# Patient Record
Sex: FEMALE | Race: WHITE | NOT HISPANIC OR LATINO | Employment: OTHER | URBAN - METROPOLITAN AREA
[De-identification: names, ages, dates, MRNs, and addresses within clinical notes are randomized per-mention and may not be internally consistent; named-entity substitution may affect disease eponyms.]

---

## 2021-04-14 ENCOUNTER — HISTORICAL (OUTPATIENT)
Dept: ADMINISTRATIVE | Facility: HOSPITAL | Age: 81
End: 2021-04-14

## 2021-04-14 LAB
ABS NEUT (OLG): 3.2 X10(3)/MCL (ref 2.1–9.2)
ALBUMIN SERPL-MCNC: 4.3 GM/DL (ref 3.4–4.8)
ALBUMIN/GLOB SERPL: 1.5 RATIO (ref 1.1–2)
ALP SERPL-CCNC: 89 UNIT/L (ref 40–150)
ALT SERPL-CCNC: 11 UNIT/L (ref 0–55)
AST SERPL-CCNC: 12 UNIT/L (ref 5–34)
BASOPHILS # BLD AUTO: 0.1 X10(3)/MCL (ref 0–0.2)
BASOPHILS NFR BLD AUTO: 1 %
BILIRUB SERPL-MCNC: 0.3 MG/DL
BILIRUBIN DIRECT+TOT PNL SERPL-MCNC: 0.1 MG/DL (ref 0–0.8)
BILIRUBIN DIRECT+TOT PNL SERPL-MCNC: 0.2 MG/DL (ref 0–0.5)
BUN SERPL-MCNC: 62 MG/DL (ref 9.8–20.1)
CALCIUM SERPL-MCNC: 9.6 MG/DL (ref 8.4–10.2)
CHLORIDE SERPL-SCNC: 103 MMOL/L (ref 98–107)
CHOLEST SERPL-MCNC: 188 MG/DL
CHOLEST/HDLC SERPL: 4 {RATIO} (ref 0–5)
CO2 SERPL-SCNC: 26 MMOL/L (ref 23–31)
CREAT SERPL-MCNC: 2.32 MG/DL (ref 0.55–1.02)
EOSINOPHIL # BLD AUTO: 0.4 X10(3)/MCL (ref 0–0.9)
EOSINOPHIL NFR BLD AUTO: 7 %
ERYTHROCYTE [DISTWIDTH] IN BLOOD BY AUTOMATED COUNT: 15.2 % (ref 11.5–17)
GLOBULIN SER-MCNC: 2.9 GM/DL (ref 2.4–3.5)
GLUCOSE SERPL-MCNC: 87 MG/DL (ref 82–115)
HCT VFR BLD AUTO: 44.7 % (ref 37–47)
HDLC SERPL-MCNC: 45 MG/DL (ref 35–60)
HGB BLD-MCNC: 13.9 GM/DL (ref 12–16)
LDLC SERPL CALC-MCNC: 106 MG/DL (ref 50–140)
LYMPHOCYTES # BLD AUTO: 1.7 X10(3)/MCL (ref 0.6–4.6)
LYMPHOCYTES NFR BLD AUTO: 27 %
MCH RBC QN AUTO: 28.9 PG (ref 27–31)
MCHC RBC AUTO-ENTMCNC: 31.1 GM/DL (ref 33–36)
MCV RBC AUTO: 92.9 FL (ref 80–94)
MONOCYTES # BLD AUTO: 0.7 X10(3)/MCL (ref 0.1–1.3)
MONOCYTES NFR BLD AUTO: 12 %
NEUTROPHILS # BLD AUTO: 3.2 X10(3)/MCL (ref 2.1–9.2)
NEUTROPHILS NFR BLD AUTO: 52 %
PLATELET # BLD AUTO: 447 X10(3)/MCL (ref 130–400)
PMV BLD AUTO: 9.6 FL (ref 9.4–12.4)
POTASSIUM SERPL-SCNC: 4.6 MMOL/L (ref 3.5–5.1)
PROT SERPL-MCNC: 7.2 GM/DL (ref 5.8–7.6)
RBC # BLD AUTO: 4.81 X10(6)/MCL (ref 4.2–5.4)
SODIUM SERPL-SCNC: 140 MMOL/L (ref 136–145)
TRIGL SERPL-MCNC: 185 MG/DL (ref 37–140)
TSH SERPL-ACNC: 1.27 UIU/ML (ref 0.35–4.94)
VLDLC SERPL CALC-MCNC: 37 MG/DL
WBC # SPEC AUTO: 6.2 X10(3)/MCL (ref 4.5–11.5)

## 2021-07-07 ENCOUNTER — HISTORICAL (OUTPATIENT)
Dept: ADMINISTRATIVE | Facility: HOSPITAL | Age: 81
End: 2021-07-07

## 2021-07-07 LAB
ABS NEUT (OLG): 3.76 X10(3)/MCL (ref 2.1–9.2)
ALBUMIN SERPL-MCNC: 3.5 GM/DL (ref 3.4–4.8)
ALBUMIN/GLOB SERPL: 1.2 RATIO (ref 1.1–2)
ALP SERPL-CCNC: 83 UNIT/L (ref 40–150)
ALT SERPL-CCNC: 7 UNIT/L (ref 0–55)
APPEARANCE, UA: CLEAR
AST SERPL-CCNC: 11 UNIT/L (ref 5–34)
BACTERIA #/AREA URNS AUTO: ABNORMAL /HPF
BASOPHILS # BLD AUTO: 0.1 X10(3)/MCL (ref 0–0.2)
BASOPHILS NFR BLD AUTO: 1 %
BILIRUB SERPL-MCNC: 0.6 MG/DL
BILIRUB UR QL STRIP: NEGATIVE
BILIRUBIN DIRECT+TOT PNL SERPL-MCNC: 0.3 MG/DL (ref 0–0.5)
BILIRUBIN DIRECT+TOT PNL SERPL-MCNC: 0.3 MG/DL (ref 0–0.8)
BUN SERPL-MCNC: 18.7 MG/DL (ref 9.8–20.1)
CALCIUM SERPL-MCNC: 9.7 MG/DL (ref 8.4–10.2)
CHLORIDE SERPL-SCNC: 106 MMOL/L (ref 98–107)
CO2 SERPL-SCNC: 26 MMOL/L (ref 23–31)
COLOR UR: YELLOW
CREAT SERPL-MCNC: 1.09 MG/DL (ref 0.55–1.02)
EOSINOPHIL # BLD AUTO: 0.9 X10(3)/MCL (ref 0–0.9)
EOSINOPHIL NFR BLD AUTO: 15 %
ERYTHROCYTE [DISTWIDTH] IN BLOOD BY AUTOMATED COUNT: 14.8 % (ref 11.5–17)
GLOBULIN SER-MCNC: 2.9 GM/DL (ref 2.4–3.5)
GLUCOSE (UA): NEGATIVE
GLUCOSE SERPL-MCNC: 98 MG/DL (ref 82–115)
HCT VFR BLD AUTO: 37.5 % (ref 37–47)
HGB BLD-MCNC: 11.5 GM/DL (ref 12–16)
HGB UR QL STRIP: NEGATIVE
KETONES UR QL STRIP: NEGATIVE
LEUKOCYTE ESTERASE UR QL STRIP: ABNORMAL
LYMPHOCYTES # BLD AUTO: 0.8 X10(3)/MCL (ref 0.6–4.6)
LYMPHOCYTES NFR BLD AUTO: 13 %
MAGNESIUM SERPL-MCNC: 2.1 MG/DL (ref 1.6–2.6)
MCH RBC QN AUTO: 29.3 PG (ref 27–31)
MCHC RBC AUTO-ENTMCNC: 30.7 GM/DL (ref 33–36)
MCV RBC AUTO: 95.7 FL (ref 80–94)
MONOCYTES # BLD AUTO: 0.5 X10(3)/MCL (ref 0.1–1.3)
MONOCYTES NFR BLD AUTO: 8 %
NEUTROPHILS # BLD AUTO: 3.76 X10(3)/MCL (ref 2.1–9.2)
NEUTROPHILS NFR BLD AUTO: 62 %
NITRITE UR QL STRIP.AUTO: NEGATIVE
PH UR STRIP: 7 [PH] (ref 5–9)
PLATELET # BLD AUTO: 271 X10(3)/MCL (ref 130–400)
PMV BLD AUTO: 10.4 FL (ref 9.4–12.4)
POTASSIUM SERPL-SCNC: 4.6 MMOL/L (ref 3.5–5.1)
PROT SERPL-MCNC: 6.4 GM/DL (ref 5.8–7.6)
PROT UR QL STRIP: NEGATIVE
RBC # BLD AUTO: 3.92 X10(6)/MCL (ref 4.2–5.4)
RBC #/AREA URNS HPF: ABNORMAL /[HPF]
SODIUM SERPL-SCNC: 145 MMOL/L (ref 136–145)
SP GR UR STRIP: 1.02 (ref 1–1.03)
SQUAMOUS EPITHELIAL, UA: ABNORMAL /HPF (ref 0–4)
URATE SERPL-MCNC: 6.4 MG/DL (ref 2.6–6)
UROBILINOGEN UR STRIP-ACNC: 0.2
WBC # SPEC AUTO: 6.1 X10(3)/MCL (ref 4.5–11.5)
WBC #/AREA URNS AUTO: 5 /HPF (ref 0–3)

## 2021-07-09 LAB — FINAL CULTURE: NORMAL

## 2021-07-28 ENCOUNTER — NURSE TRIAGE (OUTPATIENT)
Dept: ADMINISTRATIVE | Facility: CLINIC | Age: 81
End: 2021-07-28

## 2021-10-13 ENCOUNTER — HISTORICAL (OUTPATIENT)
Dept: ADMINISTRATIVE | Facility: HOSPITAL | Age: 81
End: 2021-10-13

## 2021-10-13 LAB
ABS NEUT (OLG): 3.22 X10(3)/MCL (ref 2.1–9.2)
ALBUMIN SERPL-MCNC: 4.1 GM/DL (ref 3.4–4.8)
ALBUMIN/GLOB SERPL: 1.2 RATIO (ref 1.1–2)
ALP SERPL-CCNC: 90 UNIT/L (ref 40–150)
ALT SERPL-CCNC: 8 UNIT/L (ref 0–55)
APPEARANCE, UA: CLEAR
AST SERPL-CCNC: 14 UNIT/L (ref 5–34)
BACTERIA SPEC CULT: ABNORMAL /HPF
BASOPHILS # BLD AUTO: 0.1 X10(3)/MCL (ref 0–0.2)
BASOPHILS NFR BLD AUTO: 1 %
BILIRUB SERPL-MCNC: 0.6 MG/DL
BILIRUB UR QL STRIP: NEGATIVE
BILIRUBIN DIRECT+TOT PNL SERPL-MCNC: 0.2 MG/DL (ref 0–0.5)
BILIRUBIN DIRECT+TOT PNL SERPL-MCNC: 0.4 MG/DL (ref 0–0.8)
BUN SERPL-MCNC: 28.7 MG/DL (ref 9.8–20.1)
CALCIUM SERPL-MCNC: 10.5 MG/DL (ref 8.4–10.2)
CHLORIDE SERPL-SCNC: 103 MMOL/L (ref 98–107)
CHOLEST SERPL-MCNC: 204 MG/DL
CHOLEST/HDLC SERPL: 4 {RATIO} (ref 0–5)
CO2 SERPL-SCNC: 29 MMOL/L (ref 23–31)
COLOR UR: YELLOW
CREAT SERPL-MCNC: 1.31 MG/DL (ref 0.55–1.02)
EOSINOPHIL # BLD AUTO: 0.7 X10(3)/MCL (ref 0–0.9)
EOSINOPHIL NFR BLD AUTO: 12 %
ERYTHROCYTE [DISTWIDTH] IN BLOOD BY AUTOMATED COUNT: 14.5 % (ref 11.5–17)
GLOBULIN SER-MCNC: 3.3 GM/DL (ref 2.4–3.5)
GLUCOSE (UA): NEGATIVE
GLUCOSE SERPL-MCNC: 99 MG/DL (ref 82–115)
HCT VFR BLD AUTO: 46.5 % (ref 37–47)
HDLC SERPL-MCNC: 55 MG/DL (ref 35–60)
HGB BLD-MCNC: 14.4 GM/DL (ref 12–16)
HGB UR QL STRIP: NEGATIVE
KETONES UR QL STRIP: NEGATIVE
LDLC SERPL CALC-MCNC: 128 MG/DL (ref 50–140)
LEUKOCYTE ESTERASE UR QL STRIP: ABNORMAL
LYMPHOCYTES # BLD AUTO: 1.1 X10(3)/MCL (ref 0.6–4.6)
LYMPHOCYTES NFR BLD AUTO: 20 %
MAGNESIUM SERPL-MCNC: 2.1 MG/DL (ref 1.6–2.6)
MCH RBC QN AUTO: 29.5 PG (ref 27–31)
MCHC RBC AUTO-ENTMCNC: 31 GM/DL (ref 33–36)
MCV RBC AUTO: 95.3 FL (ref 80–94)
MONOCYTES # BLD AUTO: 0.6 X10(3)/MCL (ref 0.1–1.3)
MONOCYTES NFR BLD AUTO: 10 %
NEUTROPHILS # BLD AUTO: 3.22 X10(3)/MCL (ref 2.1–9.2)
NEUTROPHILS NFR BLD AUTO: 56 %
NITRITE UR QL STRIP: NEGATIVE
PH UR STRIP: 6 [PH] (ref 5–9)
PLATELET # BLD AUTO: 354 X10(3)/MCL (ref 130–400)
PMV BLD AUTO: 10.1 FL (ref 9.4–12.4)
POTASSIUM SERPL-SCNC: 5.1 MMOL/L (ref 3.5–5.1)
PROT SERPL-MCNC: 7.4 GM/DL (ref 5.8–7.6)
PROT UR QL STRIP: NEGATIVE
RBC # BLD AUTO: 4.88 X10(6)/MCL (ref 4.2–5.4)
RBC #/AREA URNS HPF: ABNORMAL /[HPF]
SODIUM SERPL-SCNC: 145 MMOL/L (ref 136–145)
SP GR UR STRIP: 1.02 (ref 1–1.03)
SQUAMOUS EPITHELIAL, UA: ABNORMAL /HPF (ref 0–4)
TRIGL SERPL-MCNC: 103 MG/DL (ref 37–140)
URATE SERPL-MCNC: 8.4 MG/DL (ref 2.6–6)
UROBILINOGEN UR STRIP-ACNC: 0.2
VLDLC SERPL CALC-MCNC: 21 MG/DL
WBC # SPEC AUTO: 5.8 X10(3)/MCL (ref 4.5–11.5)
WBC #/AREA URNS HPF: ABNORMAL /[HPF]

## 2021-12-09 ENCOUNTER — HISTORICAL (OUTPATIENT)
Dept: ADMINISTRATIVE | Facility: HOSPITAL | Age: 81
End: 2021-12-09

## 2021-12-09 LAB
BUN SERPL-MCNC: 24 MG/DL (ref 9.8–20.1)
CALCIUM SERPL-MCNC: 9.2 MG/DL (ref 8.7–10.5)
CHLORIDE SERPL-SCNC: 105 MMOL/L (ref 98–107)
CO2 SERPL-SCNC: 30 MMOL/L (ref 23–31)
CREAT SERPL-MCNC: 1.03 MG/DL (ref 0.55–1.02)
CREAT/UREA NIT SERPL: 23
GLUCOSE SERPL-MCNC: 102 MG/DL (ref 82–115)
POTASSIUM SERPL-SCNC: 4.3 MMOL/L (ref 3.5–5.1)
SODIUM SERPL-SCNC: 141 MMOL/L (ref 136–145)

## 2022-01-12 ENCOUNTER — HISTORICAL (OUTPATIENT)
Dept: ADMINISTRATIVE | Facility: HOSPITAL | Age: 82
End: 2022-01-12

## 2022-01-12 LAB
ABS NEUT (OLG): 4.6 X10(3)/MCL (ref 2.1–9.2)
ALBUMIN SERPL-MCNC: 3.8 GM/DL (ref 3.4–4.8)
ALBUMIN/GLOB SERPL: 1.3 RATIO (ref 1.1–2)
ALP SERPL-CCNC: 105 UNIT/L (ref 40–150)
ALT SERPL-CCNC: 8 UNIT/L (ref 0–55)
APPEARANCE, UA: CLEAR
AST SERPL-CCNC: 12 UNIT/L (ref 5–34)
BACTERIA SPEC CULT: ABNORMAL /HPF
BASOPHILS # BLD AUTO: 0.1 X10(3)/MCL (ref 0–0.2)
BASOPHILS NFR BLD AUTO: 1 %
BILIRUB SERPL-MCNC: 0.3 MG/DL
BILIRUB UR QL STRIP: NEGATIVE
BILIRUBIN DIRECT+TOT PNL SERPL-MCNC: 0.1 MG/DL (ref 0–0.8)
BILIRUBIN DIRECT+TOT PNL SERPL-MCNC: 0.2 MG/DL (ref 0–0.5)
BUN SERPL-MCNC: 22.6 MG/DL (ref 9.8–20.1)
CALCIUM SERPL-MCNC: 9.5 MG/DL (ref 8.7–10.5)
CHLORIDE SERPL-SCNC: 99 MMOL/L (ref 98–107)
CO2 SERPL-SCNC: 30 MMOL/L (ref 23–31)
COLOR UR: YELLOW
CREAT SERPL-MCNC: 1.23 MG/DL (ref 0.55–1.02)
EOSINOPHIL # BLD AUTO: 0.7 X10(3)/MCL (ref 0–0.9)
EOSINOPHIL NFR BLD AUTO: 10 %
ERYTHROCYTE [DISTWIDTH] IN BLOOD BY AUTOMATED COUNT: 13.8 % (ref 11.5–17)
GLOBULIN SER-MCNC: 3 GM/DL (ref 2.4–3.5)
GLUCOSE (UA): NEGATIVE
GLUCOSE SERPL-MCNC: 99 MG/DL (ref 82–115)
HCT VFR BLD AUTO: 42.5 % (ref 37–47)
HGB BLD-MCNC: 13 GM/DL (ref 12–16)
HGB UR QL STRIP: NEGATIVE
KETONES UR QL STRIP: NEGATIVE
LEUKOCYTE ESTERASE UR QL STRIP: ABNORMAL
LYMPHOCYTES # BLD AUTO: 0.8 X10(3)/MCL (ref 0.6–4.6)
LYMPHOCYTES NFR BLD AUTO: 12 %
MAGNESIUM SERPL-MCNC: 2 MG/DL (ref 1.6–2.6)
MCH RBC QN AUTO: 30.4 PG (ref 27–31)
MCHC RBC AUTO-ENTMCNC: 30.6 GM/DL (ref 33–36)
MCV RBC AUTO: 99.5 FL (ref 80–94)
MONOCYTES # BLD AUTO: 0.6 X10(3)/MCL (ref 0.1–1.3)
MONOCYTES NFR BLD AUTO: 9 %
NEUTROPHILS # BLD AUTO: 4.6 X10(3)/MCL (ref 2.1–9.2)
NEUTROPHILS NFR BLD AUTO: 66 %
NITRITE UR QL STRIP: NEGATIVE
PH UR STRIP: 6 [PH] (ref 5–9)
PLATELET # BLD AUTO: 336 X10(3)/MCL (ref 130–400)
PMV BLD AUTO: 9.9 FL (ref 9.4–12.4)
POTASSIUM SERPL-SCNC: 4 MMOL/L (ref 3.5–5.1)
PROT SERPL-MCNC: 6.8 GM/DL (ref 5.8–7.6)
PROT UR QL STRIP: NEGATIVE
RBC # BLD AUTO: 4.27 X10(6)/MCL (ref 4.2–5.4)
RBC #/AREA URNS HPF: ABNORMAL /[HPF]
SODIUM SERPL-SCNC: 141 MMOL/L (ref 136–145)
SP GR UR STRIP: 1.02 (ref 1–1.03)
SQUAMOUS EPITHELIAL, UA: ABNORMAL /HPF (ref 0–4)
URATE SERPL-MCNC: 6 MG/DL (ref 2.6–6)
UROBILINOGEN UR STRIP-ACNC: 0.2
WBC # SPEC AUTO: 6.9 X10(3)/MCL (ref 4.5–11.5)
WBC #/AREA URNS AUTO: ABNORMAL /HPF (ref 0–3)
WBC #/AREA URNS HPF: ABNORMAL /[HPF]

## 2022-04-11 ENCOUNTER — HISTORICAL (OUTPATIENT)
Dept: ADMINISTRATIVE | Facility: HOSPITAL | Age: 82
End: 2022-04-11
Payer: MEDICARE

## 2022-04-29 VITALS
OXYGEN SATURATION: 98 % | WEIGHT: 169.31 LBS | BODY MASS INDEX: 31.97 KG/M2 | SYSTOLIC BLOOD PRESSURE: 154 MMHG | HEIGHT: 61 IN | DIASTOLIC BLOOD PRESSURE: 82 MMHG

## 2022-05-10 ENCOUNTER — TELEPHONE (OUTPATIENT)
Dept: FAMILY MEDICINE | Facility: CLINIC | Age: 82
End: 2022-05-10
Payer: MEDICARE

## 2022-05-10 NOTE — TELEPHONE ENCOUNTER
Patient called back to inform me that she does not have anyone going to her house to check her BP anymore, and she only has a manual BP cuff. States she has not been having any side effects from the increased dosage of Amlodipine. Instructed pt to buy an electronic BP cuff to start monitoring at home, and if BP is elevated to give us a call to notify us of that reading. Pt verbalized understanding and states she will go get a cuff.

## 2022-06-15 ENCOUNTER — OFFICE VISIT (OUTPATIENT)
Dept: FAMILY MEDICINE | Facility: CLINIC | Age: 82
End: 2022-06-15
Payer: MEDICARE

## 2022-06-15 VITALS
RESPIRATION RATE: 16 BRPM | HEART RATE: 62 BPM | DIASTOLIC BLOOD PRESSURE: 74 MMHG | OXYGEN SATURATION: 96 % | SYSTOLIC BLOOD PRESSURE: 136 MMHG | TEMPERATURE: 99 F | HEIGHT: 61 IN | BODY MASS INDEX: 34.43 KG/M2 | WEIGHT: 182.38 LBS

## 2022-06-15 DIAGNOSIS — L30.4 INTERTRIGO: ICD-10-CM

## 2022-06-15 DIAGNOSIS — I50.9 CONGESTIVE HEART FAILURE, UNSPECIFIED HF CHRONICITY, UNSPECIFIED HEART FAILURE TYPE: Primary | ICD-10-CM

## 2022-06-15 DIAGNOSIS — G62.9 PERIPHERAL POLYNEUROPATHY: ICD-10-CM

## 2022-06-15 PROBLEM — Z86.718 HISTORY OF DEEP VENOUS THROMBOSIS: Status: ACTIVE | Noted: 2022-06-15

## 2022-06-15 PROBLEM — I48.91 ATRIAL FIBRILLATION: Status: ACTIVE | Noted: 2022-06-15

## 2022-06-15 PROBLEM — M10.9 GOUT: Status: ACTIVE | Noted: 2022-06-15

## 2022-06-15 PROBLEM — F41.1 GENERALIZED ANXIETY DISORDER: Status: ACTIVE | Noted: 2022-06-15

## 2022-06-15 PROBLEM — Z78.0 POSTMENOPAUSAL: Status: ACTIVE | Noted: 2022-06-15

## 2022-06-15 PROBLEM — I73.9 PERIPHERAL ARTERIAL DISEASE: Status: ACTIVE | Noted: 2022-06-15

## 2022-06-15 PROBLEM — M54.16 LUMBAR RADICULOPATHY: Status: ACTIVE | Noted: 2022-06-15

## 2022-06-15 PROBLEM — E78.5 HYPERLIPIDEMIA: Status: ACTIVE | Noted: 2022-06-15

## 2022-06-15 PROBLEM — N18.9 CHRONIC KIDNEY DISEASE: Status: ACTIVE | Noted: 2022-06-15

## 2022-06-15 PROBLEM — J44.9 CHRONIC OBSTRUCTIVE PULMONARY DISEASE: Status: ACTIVE | Noted: 2022-06-15

## 2022-06-15 PROBLEM — J98.59 MEDIASTINAL MASS: Status: ACTIVE | Noted: 2022-06-15

## 2022-06-15 PROBLEM — I10 HYPERTENSION: Status: ACTIVE | Noted: 2022-06-15

## 2022-06-15 PROCEDURE — 3078F DIAST BP <80 MM HG: CPT | Mod: CPTII,,, | Performed by: NURSE PRACTITIONER

## 2022-06-15 PROCEDURE — 1159F MED LIST DOCD IN RCRD: CPT | Mod: CPTII,,, | Performed by: NURSE PRACTITIONER

## 2022-06-15 PROCEDURE — 1100F PR PT FALLS ASSESS DOC 2+ FALLS/FALL W/INJURY/YR: ICD-10-PCS | Mod: CPTII,,, | Performed by: NURSE PRACTITIONER

## 2022-06-15 PROCEDURE — 1100F PTFALLS ASSESS-DOCD GE2>/YR: CPT | Mod: CPTII,,, | Performed by: NURSE PRACTITIONER

## 2022-06-15 PROCEDURE — 99213 OFFICE O/P EST LOW 20 MIN: CPT | Mod: ,,, | Performed by: NURSE PRACTITIONER

## 2022-06-15 PROCEDURE — 3075F SYST BP GE 130 - 139MM HG: CPT | Mod: CPTII,,, | Performed by: NURSE PRACTITIONER

## 2022-06-15 PROCEDURE — 1159F PR MEDICATION LIST DOCUMENTED IN MEDICAL RECORD: ICD-10-PCS | Mod: CPTII,,, | Performed by: NURSE PRACTITIONER

## 2022-06-15 PROCEDURE — 3078F PR MOST RECENT DIASTOLIC BLOOD PRESSURE < 80 MM HG: ICD-10-PCS | Mod: CPTII,,, | Performed by: NURSE PRACTITIONER

## 2022-06-15 PROCEDURE — 1160F PR REVIEW ALL MEDS BY PRESCRIBER/CLIN PHARMACIST DOCUMENTED: ICD-10-PCS | Mod: CPTII,,, | Performed by: NURSE PRACTITIONER

## 2022-06-15 PROCEDURE — 1160F RVW MEDS BY RX/DR IN RCRD: CPT | Mod: CPTII,,, | Performed by: NURSE PRACTITIONER

## 2022-06-15 PROCEDURE — 3075F PR MOST RECENT SYSTOLIC BLOOD PRESS GE 130-139MM HG: ICD-10-PCS | Mod: CPTII,,, | Performed by: NURSE PRACTITIONER

## 2022-06-15 PROCEDURE — 1125F AMNT PAIN NOTED PAIN PRSNT: CPT | Mod: CPTII,,, | Performed by: NURSE PRACTITIONER

## 2022-06-15 PROCEDURE — 3288F PR FALLS RISK ASSESSMENT DOCUMENTED: ICD-10-PCS | Mod: CPTII,,, | Performed by: NURSE PRACTITIONER

## 2022-06-15 PROCEDURE — 99213 PR OFFICE/OUTPT VISIT, EST, LEVL III, 20-29 MIN: ICD-10-PCS | Mod: ,,, | Performed by: NURSE PRACTITIONER

## 2022-06-15 PROCEDURE — 3288F FALL RISK ASSESSMENT DOCD: CPT | Mod: CPTII,,, | Performed by: NURSE PRACTITIONER

## 2022-06-15 PROCEDURE — 1125F PR PAIN SEVERITY QUANTIFIED, PAIN PRESENT: ICD-10-PCS | Mod: CPTII,,, | Performed by: NURSE PRACTITIONER

## 2022-06-15 RX ORDER — FENOFIBRATE 54 MG/1
54 TABLET ORAL DAILY
COMMUNITY
Start: 2022-04-28 | End: 2022-06-17

## 2022-06-15 RX ORDER — MONTELUKAST SODIUM 10 MG/1
10 TABLET ORAL DAILY
COMMUNITY
Start: 2022-04-28 | End: 2022-06-17

## 2022-06-15 RX ORDER — METOPROLOL SUCCINATE 100 MG/1
100 TABLET, EXTENDED RELEASE ORAL DAILY
COMMUNITY
Start: 2022-04-28 | End: 2023-07-11

## 2022-06-15 RX ORDER — FLUTICASONE PROPIONATE 50 MCG
SPRAY, SUSPENSION (ML) NASAL
COMMUNITY
Start: 2022-05-09 | End: 2022-06-29

## 2022-06-15 RX ORDER — THEOPHYLLINE 400 MG/1
0.5 TABLET, EXTENDED RELEASE ORAL 2 TIMES DAILY
COMMUNITY
Start: 2021-12-27 | End: 2022-06-28

## 2022-06-15 RX ORDER — LORAZEPAM 1 MG/1
1 TABLET ORAL 2 TIMES DAILY
COMMUNITY
Start: 2022-04-21 | End: 2022-07-11

## 2022-06-15 RX ORDER — FERROUS SULFATE 325(65) MG
325 TABLET ORAL
COMMUNITY
Start: 2021-07-28 | End: 2023-07-11

## 2022-06-15 RX ORDER — PREGABALIN 75 MG/1
75 CAPSULE ORAL 2 TIMES DAILY
Qty: 60 CAPSULE | Refills: 2 | Status: SHIPPED | OUTPATIENT
Start: 2022-06-15 | End: 2022-08-31

## 2022-06-15 RX ORDER — ALLOPURINOL 100 MG/1
TABLET ORAL
COMMUNITY
Start: 2022-05-02 | End: 2022-09-14

## 2022-06-15 RX ORDER — METHOCARBAMOL 750 MG/1
TABLET, FILM COATED ORAL
COMMUNITY
Start: 2022-01-11 | End: 2023-07-11

## 2022-06-15 RX ORDER — AMLODIPINE BESYLATE 10 MG/1
10 TABLET ORAL DAILY
COMMUNITY
Start: 2022-04-25 | End: 2023-04-10

## 2022-06-15 RX ORDER — DOXEPIN HYDROCHLORIDE 25 MG/1
25 CAPSULE ORAL NIGHTLY
COMMUNITY
Start: 2022-04-28 | End: 2022-06-17

## 2022-06-15 RX ORDER — APIXABAN 5 MG/1
5 TABLET, FILM COATED ORAL 2 TIMES DAILY
COMMUNITY
Start: 2022-05-02

## 2022-06-15 RX ORDER — PREGABALIN 50 MG/1
50 CAPSULE ORAL 2 TIMES DAILY
COMMUNITY
Start: 2022-04-28 | End: 2022-06-15

## 2022-06-15 RX ORDER — NYSTATIN 100000 [USP'U]/G
POWDER TOPICAL 4 TIMES DAILY
Qty: 60 G | Refills: 5 | Status: SHIPPED | OUTPATIENT
Start: 2022-06-15 | End: 2023-07-11

## 2022-06-15 RX ORDER — ALBUTEROL SULFATE 0.83 MG/ML
SOLUTION RESPIRATORY (INHALATION)
COMMUNITY
Start: 2022-04-27 | End: 2023-07-25

## 2022-06-15 RX ORDER — BUDESONIDE, GLYCOPYRROLATE, AND FORMOTEROL FUMARATE 160; 9; 4.8 UG/1; UG/1; UG/1
AEROSOL, METERED RESPIRATORY (INHALATION)
COMMUNITY
Start: 2022-04-25 | End: 2022-09-07 | Stop reason: SDUPTHER

## 2022-06-15 NOTE — PROGRESS NOTES
Subjective:       Patient ID: Jaylin Marcum is a 81 y.o. female.    Chief Complaint: Leg Swelling (X2 weeks), Ankle Pain (X2 weeks), and Rash (Under bilat breast for a couple of months)      HPI     This is an 81-year-old white female who presents to clinic today with complaint of bilateral ankle and foot burning pain that has been worsening over the last 2 weeks.  Also has been having increased bilateral lower extremity swelling for the last couple of weeks.  Has a rash under both breasts that has been there for months.      Review of Systems   Constitutional: Negative.    HENT: Negative.    Eyes: Negative.    Respiratory: Positive for shortness of breath.    Cardiovascular: Positive for leg swelling.   Gastrointestinal: Negative.    Endocrine: Negative.    Genitourinary: Negative.    Musculoskeletal: Negative.  Positive for leg pain.   Integumentary:  Positive for rash.   Allergic/Immunologic: Negative.    Neurological: Negative.    Hematological: Negative.    Psychiatric/Behavioral: Negative.    All other systems reviewed and are negative.          The patient's Health Maintenance was reviewed and the following appears to be due:   Health Maintenance Due   Topic Date Due    COVID-19 Vaccine (1) Never done    DEXA Scan  Never done    Shingles Vaccine (1 of 2) Never done    Pneumococcal Vaccines (Age 65+) (1 - PCV) Never done    TETANUS VACCINE  10/14/2015       Past Medical History:  Past Medical History:   Diagnosis Date    Anxiety disorder, unspecified     CHF (congestive heart failure)     Chronic obstructive lung disease     CKD (chronic kidney disease)     DVT (deep venous thrombosis)     Gout, unspecified     Hyperlipidemia     Hypertensive disorder     Lumbar radiculopathy     Mediastinal mass     Paroxysmal atrial fibrillation     Peripheral artery disease     Postmenopausal state      Past Surgical History:   Procedure Laterality Date    APPENDECTOMY      DILATION AND CURETTAGE OF  UTERUS       Review of patient's allergies indicates:   Allergen Reactions    Penicillin Rash    Codeine Itching     Current Outpatient Medications on File Prior to Visit   Medication Sig Dispense Refill    albuterol (PROVENTIL) 2.5 mg /3 mL (0.083 %) nebulizer solution       allopurinoL (ZYLOPRIM) 100 MG tablet TAKE ONE TABLET BY MOUTH DAILY for gout      amLODIPine (NORVASC) 10 MG tablet Take 10 mg by mouth once daily.      BREZTRI AEROSPHERE 160-9-4.8 mcg/actuation HFAA use 2 piuffs by mouth twice daily rinse mouth and throat after use      doxepin (SINEQUAN) 25 MG capsule Take 25 mg by mouth every evening.      ELIQUIS 5 mg Tab Take 5 mg by mouth 2 (two) times daily.      fenofibrate (TRICOR) 54 MG tablet Take 54 mg by mouth once daily.      ferrous sulfate (FEROSUL) 325 mg (65 mg iron) Tab tablet Take 325 mg by mouth.      fluticasone propionate (FLONASE) 50 mcg/actuation nasal spray       LORazepam (ATIVAN) 1 MG tablet Take 1 mg by mouth 2 (two) times daily.      metoprolol succinate (TOPROL-XL) 100 MG 24 hr tablet Take 100 mg by mouth once daily.      montelukast (SINGULAIR) 10 mg tablet Take 10 mg by mouth once daily.      theophylline 400 mg Tb24 Take 0.5 tablets by mouth 2 (two) times daily.      [DISCONTINUED] pregabalin (LYRICA) 50 MG capsule Take 50 mg by mouth 2 (two) times daily.      cetirizine (ZYRTEC) 10 MG tablet TAKE ONE TABLET BY MOUTH IN THE EVENING 30 tablet 5    furosemide (LASIX) 40 MG tablet TAKE ONE TABLET BY MOUTH IN THE MORNING and at 2 pm 60 tablet 5    methocarbamoL (ROBAXIN) 750 MG Tab TAKE ONE TABLET BY MOUTH THREE TIMES DAILY AS NEEDED for back pain and spasm       No current facility-administered medications on file prior to visit.     Social History     Socioeconomic History    Marital status:    Tobacco Use    Smoking status: Former Smoker    Smokeless tobacco: Never Used   Substance and Sexual Activity    Alcohol use: Not Currently    Drug use:  "Never    Sexual activity: Not Currently     Family History   Problem Relation Age of Onset    Lung cancer Mother     Brain cancer Mother     Heart disease Father     Heart disease Brother          Objective:       /74 (BP Location: Left arm)   Pulse 62   Temp 98.9 °F (37.2 °C) (Oral)   Resp 16   Ht 5' 1" (1.549 m)   Wt 82.7 kg (182 lb 6.4 oz)   SpO2 96%   BMI 34.46 kg/m²      Physical Exam  Vitals and nursing note reviewed.   Constitutional:       Appearance: Normal appearance.   HENT:      Head: Normocephalic and atraumatic.      Right Ear: Tympanic membrane, ear canal and external ear normal.      Left Ear: Tympanic membrane, ear canal and external ear normal.      Nose: Nose normal.      Mouth/Throat:      Mouth: Mucous membranes are moist.      Pharynx: Oropharynx is clear.   Eyes:      Extraocular Movements: Extraocular movements intact.      Conjunctiva/sclera: Conjunctivae normal.      Pupils: Pupils are equal, round, and reactive to light.   Cardiovascular:      Rate and Rhythm: Normal rate and regular rhythm.      Heart sounds: Normal heart sounds.   Pulmonary:      Effort: Pulmonary effort is normal.      Breath sounds: Normal breath sounds.   Musculoskeletal:         General: Normal range of motion.      Cervical back: Normal range of motion and neck supple.      Right lower le+ Pitting Edema present.      Left lower le+ Pitting Edema present.   Skin:     General: Skin is warm and dry.      Findings: Rash (Beefy red under both breasts) present.   Neurological:      General: No focal deficit present.      Mental Status: She is alert and oriented to person, place, and time.   Psychiatric:         Mood and Affect: Mood normal.         Behavior: Behavior normal.         Thought Content: Thought content normal.         Judgment: Judgment normal.         Labs  No visits with results within 2 Month(s) from this visit.   Latest known visit with results is:   Historical on 2022 "   Component Date Value Ref Range Status    Alanine Aminotransferase 01/12/2022 8  0 - 55 unit/L Final    Albumin/Globulin Ratio 01/12/2022 1.3  1.1 - 2.0 ratio Final    Albumin Level 01/12/2022 3.8  3.4 - 4.8 gm/dL Final    Alkaline Phosphatase 01/12/2022 105  40 - 150 unit/L Final    Aspartate Aminotransferase 01/12/2022 12  5 - 34 unit/L Final    Blood Urea Nitrogen 01/12/2022 22.6 (A) 9.8 - 20.1 mg/dL Final    Bilirubin Total 01/12/2022 0.3  <<=1.5 mg/dL Final    Bilirubin Direct 01/12/2022 0.2  0.0 - 0.5 mg/dL Final    Bilirubin Indirect 01/12/2022 0.10  0.00 - 0.80 mg/dL Final    Chloride 01/12/2022 99  98 - 107 mmol/L Final    Carbon Dioxide 01/12/2022 30  23 - 31 mmol/L Final    Calcium Level Total 01/12/2022 9.5  8.7 - 10.5 mg/dL Final    Creatinine 01/12/2022 1.23 (A) 0.55 - 1.02 mg/dL Final    Glucose Level 01/12/2022 99  82 - 115 mg/dL Final    Globulin 01/12/2022 3.0  2.4 - 3.5 gm/dL Final    Sodium Level 01/12/2022 141  136 - 145 mmol/L Final    Potassium Level 01/12/2022 4.0  3.5 - 5.1 mmol/L Final    Protein Total 01/12/2022 6.8  5.8 - 7.6 gm/dL Final    Magnesium Level 01/12/2022 2.00  1.60 - 2.60 mg/dL Final    Uric Acid 01/12/2022 6.0  2.6 - 6.0 mg/dL Final    Estimated GFR- 01/12/2022 54   Final    Estimated GFR-Non  01/12/2022 45  mL/min/1.73 m2 Final    Abs Neut 01/12/2022 4.60  2.10 - 9.20 x10(3)/mcL Final    Hgb 01/12/2022 13.0  12.0 - 16.0 gm/dL Final    Hct 01/12/2022 42.5  37.0 - 47.0 % Final    MCV 01/12/2022 99.5 (A) 80.0 - 94.0 fL Final    MCH 01/12/2022 30.4  27.0 - 31.0 pg Final    MCHC 01/12/2022 30.6 (A) 33.0 - 36.0 gm/dL Final    MPV 01/12/2022 9.9  9.4 - 12.4 fL Final    WBC 01/12/2022 6.9  4.5 - 11.5 x10(3)/mcL Final    RBC 01/12/2022 4.27  4.20 - 5.40 x10(6)/mcL Final    RDW 01/12/2022 13.8  11.5 - 17.0 % Final    Platelet 01/12/2022 336  130 - 400 x10(3)/mcL Final    Basophil % 01/12/2022 1  % Final    Neut #  01/12/2022 4.60  2.10 - 9.20 x10(3)/mcL Final    Lymph # 01/12/2022 0.8  0.6 - 4.6 x10(3)/mcL Final    Mono # 01/12/2022 0.6  0.1 - 1.3 x10(3)/mcL Final    Eos # 01/12/2022 0.7  0.0 - 0.9 x10(3)/mcL Final    Baso # 01/12/2022 0.1  0.0 - 0.2 x10(3)/mcL Final    Neut % 01/12/2022 66  % Final    Lymph % 01/12/2022 12  % Final    Mono % 01/12/2022 9  % Final    Eos % 01/12/2022 10  % Final    Color, UA 01/12/2022 YELLOW  >YELLOW Final    Appearance, UA 01/12/2022 CLEAR  >Clear Final    Specific Gravity,UA 01/12/2022 1.019  1.000 - 1.032 Final    pH, UA 01/12/2022 6.0  5.0 - 9.0 Final    Glucose, UA 01/12/2022 Negative  >Negative Final    Protein, UA 01/12/2022 Negative  >Negative Final    Occult Blood UA 01/12/2022 Negative  >Negative Final    Ketones, UA 01/12/2022 Negative  >Negative Final    Bilirubin (UA) 01/12/2022 Negative  >Negative Final    Urobilinogen, UA 01/12/2022 0.2   Final    Leukocytes, UA 01/12/2022 Trace (A) >Negative Final    Nitrite, UA 01/12/2022 Negative  >Negative Final    WBC, UA 01/12/2022 NONE SEEN  >0 - 2 Final    RBC, UA 01/12/2022 NONE SEEN  >0 - 2 Final    Bacteria 01/12/2022 NONE SEEN  >None Seen /HPF Final    Squam Epithel, UA 01/12/2022 NONE SEEN  0 - 4 /HPF Final             Assessment:       Problem List Items Addressed This Visit        Neuro    Peripheral neuropathy    Relevant Medications    pregabalin (LYRICA) 75 MG capsule       Derm    Intertrigo    Relevant Medications    nystatin (MYCOSTATIN) powder          Plan:         1. Intertrigo  Nystatin powder as directed.  - nystatin (MYCOSTATIN) powder; Apply topically 4 (four) times daily.  Dispense: 60 g; Refill: 5    2. Peripheral polyneuropathy  Increase Lyrica to 75 mg twice daily.  - pregabalin (LYRICA) 75 MG capsule; Take 1 capsule (75 mg total) by mouth 2 (two) times daily.  Dispense: 60 capsule; Refill: 2    3. Congestive heart failure, unspecified HF chronicity, unspecified heart failure  type  Patient has daily weight log with her today, she has actually lost 10 lb in the past few weeks.  Instructed her to elevate both legs when at rest.  Instructed follow-up with Dr. Lugo.

## 2022-07-27 ENCOUNTER — TELEPHONE (OUTPATIENT)
Dept: FAMILY MEDICINE | Facility: CLINIC | Age: 82
End: 2022-07-27
Payer: MEDICARE

## 2022-07-27 NOTE — TELEPHONE ENCOUNTER
Patient is requesting Home Health d/t confusion with medications. States she is unable to see well d/t cataracts and is not sure if she is doing her medications correctly. Asking if she could possibly get a nurse out to her house once weekly to help with meds and check BP.

## 2022-07-27 NOTE — TELEPHONE ENCOUNTER
I can set her up with home health but we need a face to face visit for [facility] for insurance to approve. Please schedule her a visit for  set up

## 2022-07-27 NOTE — TELEPHONE ENCOUNTER
----- Message from Kennedy Ponce sent at 7/27/2022 10:41 AM CDT -----  .Type:  Needs Medical Advice    Who Called: Jaylin  Symptoms (please be specific):    How long has patient had these symptoms:    Pharmacy name and phone #:    Would the patient rather a call back or a response via [facility]?   Best Call Back Number: [phone]  Additional Information: She requested to speak with nurse re: home health care she lives by myself and she is confusing with all of her medication she is taking

## 2022-08-03 ENCOUNTER — OFFICE VISIT (OUTPATIENT)
Dept: FAMILY MEDICINE | Facility: CLINIC | Age: 82
End: 2022-08-03
Payer: MEDICARE

## 2022-08-03 VITALS
WEIGHT: 181.38 LBS | DIASTOLIC BLOOD PRESSURE: 90 MMHG | TEMPERATURE: 99 F | BODY MASS INDEX: 34.24 KG/M2 | RESPIRATION RATE: 16 BRPM | HEART RATE: 70 BPM | SYSTOLIC BLOOD PRESSURE: 166 MMHG | OXYGEN SATURATION: 97 % | HEIGHT: 61 IN

## 2022-08-03 DIAGNOSIS — I10 HYPERTENSION, UNSPECIFIED TYPE: ICD-10-CM

## 2022-08-03 DIAGNOSIS — Z91.148 NONCOMPLIANCE WITH MEDICATION TREATMENT DUE TO DIFFICULTY WITH DOSING: ICD-10-CM

## 2022-08-03 DIAGNOSIS — N18.31 CHRONIC KIDNEY DISEASE, STAGE 3A: ICD-10-CM

## 2022-08-03 DIAGNOSIS — I48.91 ATRIAL FIBRILLATION, UNSPECIFIED TYPE: ICD-10-CM

## 2022-08-03 DIAGNOSIS — I50.9 CONGESTIVE HEART FAILURE, UNSPECIFIED HF CHRONICITY, UNSPECIFIED HEART FAILURE TYPE: ICD-10-CM

## 2022-08-03 DIAGNOSIS — I73.9 PERIPHERAL ARTERIAL DISEASE: ICD-10-CM

## 2022-08-03 DIAGNOSIS — J44.9 CHRONIC OBSTRUCTIVE PULMONARY DISEASE, UNSPECIFIED COPD TYPE: Primary | ICD-10-CM

## 2022-08-03 PROCEDURE — 3080F DIAST BP >= 90 MM HG: CPT | Mod: CPTII,,, | Performed by: NURSE PRACTITIONER

## 2022-08-03 PROCEDURE — 1160F RVW MEDS BY RX/DR IN RCRD: CPT | Mod: CPTII,,, | Performed by: NURSE PRACTITIONER

## 2022-08-03 PROCEDURE — 3080F PR MOST RECENT DIASTOLIC BLOOD PRESSURE >= 90 MM HG: ICD-10-PCS | Mod: CPTII,,, | Performed by: NURSE PRACTITIONER

## 2022-08-03 PROCEDURE — 3288F PR FALLS RISK ASSESSMENT DOCUMENTED: ICD-10-PCS | Mod: CPTII,,, | Performed by: NURSE PRACTITIONER

## 2022-08-03 PROCEDURE — 3077F SYST BP >= 140 MM HG: CPT | Mod: CPTII,,, | Performed by: NURSE PRACTITIONER

## 2022-08-03 PROCEDURE — 1100F PTFALLS ASSESS-DOCD GE2>/YR: CPT | Mod: CPTII,,, | Performed by: NURSE PRACTITIONER

## 2022-08-03 PROCEDURE — 1126F AMNT PAIN NOTED NONE PRSNT: CPT | Mod: CPTII,,, | Performed by: NURSE PRACTITIONER

## 2022-08-03 PROCEDURE — 1100F PR PT FALLS ASSESS DOC 2+ FALLS/FALL W/INJURY/YR: ICD-10-PCS | Mod: CPTII,,, | Performed by: NURSE PRACTITIONER

## 2022-08-03 PROCEDURE — 1126F PR PAIN SEVERITY QUANTIFIED, NO PAIN PRESENT: ICD-10-PCS | Mod: CPTII,,, | Performed by: NURSE PRACTITIONER

## 2022-08-03 PROCEDURE — 3288F FALL RISK ASSESSMENT DOCD: CPT | Mod: CPTII,,, | Performed by: NURSE PRACTITIONER

## 2022-08-03 PROCEDURE — 99214 PR OFFICE/OUTPT VISIT, EST, LEVL IV, 30-39 MIN: ICD-10-PCS | Mod: ,,, | Performed by: NURSE PRACTITIONER

## 2022-08-03 PROCEDURE — 1159F MED LIST DOCD IN RCRD: CPT | Mod: CPTII,,, | Performed by: NURSE PRACTITIONER

## 2022-08-03 PROCEDURE — 99214 OFFICE O/P EST MOD 30 MIN: CPT | Mod: ,,, | Performed by: NURSE PRACTITIONER

## 2022-08-03 PROCEDURE — 3077F PR MOST RECENT SYSTOLIC BLOOD PRESSURE >= 140 MM HG: ICD-10-PCS | Mod: CPTII,,, | Performed by: NURSE PRACTITIONER

## 2022-08-03 PROCEDURE — 1159F PR MEDICATION LIST DOCUMENTED IN MEDICAL RECORD: ICD-10-PCS | Mod: CPTII,,, | Performed by: NURSE PRACTITIONER

## 2022-08-03 PROCEDURE — 1160F PR REVIEW ALL MEDS BY PRESCRIBER/CLIN PHARMACIST DOCUMENTED: ICD-10-PCS | Mod: CPTII,,, | Performed by: NURSE PRACTITIONER

## 2022-08-03 RX ORDER — DEXTROMETHORPHAN HYDROBROMIDE, GUAIFENESIN 5; 100 MG/5ML; MG/5ML
650 LIQUID ORAL EVERY 8 HOURS
COMMUNITY
End: 2023-07-11

## 2022-08-03 NOTE — PROGRESS NOTES
Subjective:       Patient ID: Jaylin Marcum is a 81 y.o. female.    Chief Complaint: Home Health (Would like to have home health come back to house to help with medications.)      HPI     81-year-old white female who presents to clinic today to discuss concerns about confusion with her medications.  Patient has complicated medical history and takes multiple daily medications.  She is having trouble filling her pillbox and sometimes takes the wrong doses of medications because she cannot see well due to her cataracts.  Patient states that she stopped taking her fenofibrate because she was having a lot of leg pain.  She has been off of it now for 2 weeks and no longer having leg pain.      Review of Systems   Constitutional: Negative.    HENT: Negative.    Eyes: Negative.    Respiratory: Negative.    Cardiovascular: Positive for leg swelling.   Gastrointestinal: Negative.    Endocrine: Negative.    Genitourinary: Negative.    Musculoskeletal: Negative.    Integumentary:  Negative.   Allergic/Immunologic: Negative.    Neurological: Negative.    Hematological: Negative.    Psychiatric/Behavioral: Negative.    All other systems reviewed and are negative.          The patient's Health Maintenance was reviewed and the following appears to be due:   Health Maintenance Due   Topic Date Due    COVID-19 Vaccine (1) Never done    Pneumococcal Vaccines (Age 65+) (1 - PCV) Never done    DEXA Scan  Never done    Shingles Vaccine (1 of 2) Never done    TETANUS VACCINE  10/14/2015       Past Medical History:  Past Medical History:   Diagnosis Date    Anxiety disorder, unspecified     CHF (congestive heart failure)     Chronic obstructive lung disease     CKD (chronic kidney disease)     DVT (deep venous thrombosis)     Gout, unspecified     Hyperlipidemia     Hypertensive disorder     Lumbar radiculopathy     Mediastinal mass     Paroxysmal atrial fibrillation     Peripheral artery disease     Postmenopausal state       Past Surgical History:   Procedure Laterality Date    APPENDECTOMY      DILATION AND CURETTAGE OF UTERUS       Review of patient's allergies indicates:   Allergen Reactions    Penicillin Rash    Codeine Itching     Current Outpatient Medications on File Prior to Visit   Medication Sig Dispense Refill    acetaminophen (TYLENOL) 650 MG TbSR Take 650 mg by mouth every 8 (eight) hours.      albuterol (PROVENTIL) 2.5 mg /3 mL (0.083 %) nebulizer solution       allopurinoL (ZYLOPRIM) 100 MG tablet TAKE ONE TABLET BY MOUTH DAILY for gout      amLODIPine (NORVASC) 10 MG tablet Take 10 mg by mouth once daily.      BREZTRI AEROSPHERE 160-9-4.8 mcg/actuation HFAA use 2 piuffs by mouth twice daily rinse mouth and throat after use      cetirizine (ZYRTEC) 10 MG tablet TAKE ONE TABLET BY MOUTH IN THE EVENING 30 tablet 5    doxepin (SINEQUAN) 25 MG capsule TAKE ONE CAPSULE BY MOUTH IN THE EVENING 30 capsule 5    ELIQUIS 5 mg Tab Take 5 mg by mouth 2 (two) times daily.      ferrous sulfate (FEOSOL) 325 mg (65 mg iron) Tab tablet Take 325 mg by mouth.      fluticasone propionate (FLONASE) 50 mcg/actuation nasal spray Use 2 sprays in each nostril once daily 16 g 5    furosemide (LASIX) 40 MG tablet TAKE ONE TABLET BY MOUTH IN THE MORNING and at 2 pm 60 tablet 5    LORazepam (ATIVAN) 1 MG tablet TAKE ONE TABLET BY MOUTH TWICE DAILY 60 tablet 2    metoprolol succinate (TOPROL-XL) 100 MG 24 hr tablet Take 100 mg by mouth once daily. Take 1 tab in AM and take 1/2 tab in evening      montelukast (SINGULAIR) 10 mg tablet TAKE ONE TABLET BY MOUTH DAILY 30 tablet 5    nystatin (MYCOSTATIN) powder Apply topically 4 (four) times daily. 60 g 5    pantoprazole (PROTONIX) 40 MG tablet TAKE ONE TABLET BY MOUTH DAILY 30 tablet 5    pramipexole (MIRAPEX) 0.25 MG tablet TAKE ONE TABLET BY MOUTH IN THE EVENING 30 tablet 5    pregabalin (LYRICA) 75 MG capsule Take 1 capsule (75 mg total) by mouth 2 (two) times daily. 60  "capsule 2    theophylline 400 mg Tb24 TAKE ONE-HALF TABLET BY MOUTH TWICE DAILY 15 tablet 5    methocarbamoL (ROBAXIN) 750 MG Tab TAKE ONE TABLET BY MOUTH THREE TIMES DAILY AS NEEDED for back pain and spasm      [DISCONTINUED] fenofibrate (TRICOR) 54 MG tablet TAKE ONE TABLET BY MOUTH DAILY (Patient not taking: Reported on 8/3/2022) 30 tablet 5     No current facility-administered medications on file prior to visit.     Social History     Socioeconomic History    Marital status:    Tobacco Use    Smoking status: Former Smoker    Smokeless tobacco: Never Used   Substance and Sexual Activity    Alcohol use: Not Currently    Drug use: Never    Sexual activity: Not Currently     Family History   Problem Relation Age of Onset    Lung cancer Mother     Brain cancer Mother     Heart disease Father     Heart disease Brother          Objective:       BP (!) 166/90 (BP Location: Left arm)   Pulse 70   Temp 98.9 °F (37.2 °C) (Oral)   Resp 16   Ht 5' 1" (1.549 m)   Wt 82.3 kg (181 lb 6.4 oz)   SpO2 97%   BMI 34.28 kg/m²      Physical Exam  Vitals and nursing note reviewed.   Constitutional:       Appearance: Normal appearance. She is obese.   HENT:      Head: Normocephalic and atraumatic.      Right Ear: Tympanic membrane, ear canal and external ear normal.      Left Ear: Tympanic membrane, ear canal and external ear normal.      Nose: Nose normal.      Mouth/Throat:      Mouth: Mucous membranes are moist.      Pharynx: Oropharynx is clear.   Eyes:      Extraocular Movements: Extraocular movements intact.      Conjunctiva/sclera: Conjunctivae normal.      Pupils: Pupils are equal, round, and reactive to light.   Cardiovascular:      Rate and Rhythm: Normal rate and regular rhythm.      Heart sounds: Normal heart sounds.   Pulmonary:      Effort: Pulmonary effort is normal.      Breath sounds: Normal breath sounds.   Musculoskeletal:         General: Normal range of motion.      Cervical back: Normal " range of motion and neck supple.      Right lower le+ Pitting Edema present.      Left lower le+ Pitting Edema present.   Skin:     General: Skin is warm and dry.   Neurological:      General: No focal deficit present.      Mental Status: She is alert and oriented to person, place, and time.   Psychiatric:         Mood and Affect: Mood normal.         Behavior: Behavior normal.         Thought Content: Thought content normal.         Judgment: Judgment normal.         Labs  No visits with results within 2 Month(s) from this visit.   Latest known visit with results is:   Historical on 2022   Component Date Value Ref Range Status    Alanine Aminotransferase 2022 8  0 - 55 unit/L Final    Albumin/Globulin Ratio 2022 1.3  1.1 - 2.0 ratio Final    Albumin Level 2022 3.8  3.4 - 4.8 gm/dL Final    Alkaline Phosphatase 2022 105  40 - 150 unit/L Final    Aspartate Aminotransferase 2022 12  5 - 34 unit/L Final    Blood Urea Nitrogen 2022 22.6 (A) 9.8 - 20.1 mg/dL Final    Bilirubin Total 2022 0.3  <<=1.5 mg/dL Final    Bilirubin Direct 2022 0.2  0.0 - 0.5 mg/dL Final    Bilirubin Indirect 2022 0.10  0.00 - 0.80 mg/dL Final    Chloride 2022 99  98 - 107 mmol/L Final    Carbon Dioxide 2022 30  23 - 31 mmol/L Final    Calcium Level Total 2022 9.5  8.7 - 10.5 mg/dL Final    Creatinine 2022 1.23 (A) 0.55 - 1.02 mg/dL Final    Glucose Level 2022 99  82 - 115 mg/dL Final    Globulin 2022 3.0  2.4 - 3.5 gm/dL Final    Sodium Level 2022 141  136 - 145 mmol/L Final    Potassium Level 2022 4.0  3.5 - 5.1 mmol/L Final    Protein Total 2022 6.8  5.8 - 7.6 gm/dL Final    Magnesium Level 2022 2.00  1.60 - 2.60 mg/dL Final    Uric Acid 2022 6.0  2.6 - 6.0 mg/dL Final    Estimated GFR- 2022 54   Final    Estimated GFR-Non  2022 45  mL/min/1.73 m2  Final    Abs Neut 01/12/2022 4.60  2.10 - 9.20 x10(3)/mcL Final    Hgb 01/12/2022 13.0  12.0 - 16.0 gm/dL Final    Hct 01/12/2022 42.5  37.0 - 47.0 % Final    MCV 01/12/2022 99.5 (A) 80.0 - 94.0 fL Final    MCH 01/12/2022 30.4  27.0 - 31.0 pg Final    MCHC 01/12/2022 30.6 (A) 33.0 - 36.0 gm/dL Final    MPV 01/12/2022 9.9  9.4 - 12.4 fL Final    WBC 01/12/2022 6.9  4.5 - 11.5 x10(3)/mcL Final    RBC 01/12/2022 4.27  4.20 - 5.40 x10(6)/mcL Final    RDW 01/12/2022 13.8  11.5 - 17.0 % Final    Platelet 01/12/2022 336  130 - 400 x10(3)/mcL Final    Basophil % 01/12/2022 1  % Final    Neut # 01/12/2022 4.60  2.10 - 9.20 x10(3)/mcL Final    Lymph # 01/12/2022 0.8  0.6 - 4.6 x10(3)/mcL Final    Mono # 01/12/2022 0.6  0.1 - 1.3 x10(3)/mcL Final    Eos # 01/12/2022 0.7  0.0 - 0.9 x10(3)/mcL Final    Baso # 01/12/2022 0.1  0.0 - 0.2 x10(3)/mcL Final    Neut % 01/12/2022 66  % Final    Lymph % 01/12/2022 12  % Final    Mono % 01/12/2022 9  % Final    Eos % 01/12/2022 10  % Final    Color, UA 01/12/2022 YELLOW  >YELLOW Final    Appearance, UA 01/12/2022 CLEAR  >Clear Final    Specific Gravity,UA 01/12/2022 1.019  1.000 - 1.032 Final    pH, UA 01/12/2022 6.0  5.0 - 9.0 Final    Glucose, UA 01/12/2022 Negative  >Negative Final    Protein, UA 01/12/2022 Negative  >Negative Final    Occult Blood UA 01/12/2022 Negative  >Negative Final    Ketones, UA 01/12/2022 Negative  >Negative Final    Bilirubin (UA) 01/12/2022 Negative  >Negative Final    Urobilinogen, UA 01/12/2022 0.2   Final    Leukocytes, UA 01/12/2022 Trace (A) >Negative Final    Nitrite, UA 01/12/2022 Negative  >Negative Final    WBC, UA 01/12/2022 NONE SEEN  >0 - 2 Final    RBC, UA 01/12/2022 NONE SEEN  >0 - 2 Final    Bacteria 01/12/2022 NONE SEEN  >None Seen /HPF Final    Squam Epithel, UA 01/12/2022 NONE SEEN  0 - 4 /HPF Final             Assessment:       Problem List Items Addressed This Visit        Pulmonary    Chronic  obstructive pulmonary disease - Primary    Relevant Orders    CBC Auto Differential    Comprehensive Metabolic Panel    Lipid Panel       Cardiac/Vascular    Atrial fibrillation    Relevant Orders    Ambulatory referral/consult to Home Health    CBC Auto Differential    Comprehensive Metabolic Panel    Lipid Panel    Congestive heart failure    Relevant Orders    Ambulatory referral/consult to Home Health    CBC Auto Differential    Comprehensive Metabolic Panel    Lipid Panel    Hypertension    Relevant Orders    CBC Auto Differential    Comprehensive Metabolic Panel    Lipid Panel    Peripheral arterial disease       Renal/    Chronic kidney disease, stage 3a    Relevant Orders    CBC Auto Differential    Comprehensive Metabolic Panel    Lipid Panel       Palliative Care    Noncompliance with medication treatment due to difficulty with dosing    Relevant Orders    Ambulatory referral/consult to Home Health          Plan:         1. Chronic obstructive pulmonary disease, unspecified COPD type  Continue current medical regimen, follow-up in 2 months  - CBC Auto Differential; Future  - Comprehensive Metabolic Panel; Future  - Lipid Panel; Future    2. Congestive heart failure, unspecified HF chronicity, unspecified heart failure type  Patient currently only taking half of a Lasix twice a day, she is supposed to be on a whole tablet twice daily, managed by Dr. Trejo.  We will get her back on the whole tablet twice daily, home health for CHF Education.  - Ambulatory referral/consult to Home Health; Future  - CBC Auto Differential; Future  - Comprehensive Metabolic Panel; Future  - Lipid Panel; Future    3. Atrial fibrillation, unspecified type  Stable, on Eliquis, managed by Dr. Trejo  - Ambulatory referral/consult to Home Health; Future  - CBC Auto Differential; Future  - Comprehensive Metabolic Panel; Future  - Lipid Panel; Future    4. Hypertension, unspecified type  Elevated in clinic today, patient only  taking half of her metoprolol twice daily instead of of full tablet in the morning and half a tablet in the afternoon.  Will get set up with home health for medication education  - CBC Auto Differential; Future  - Comprehensive Metabolic Panel; Future  - Lipid Panel; Future    5. Chronic kidney disease, stage 3a  Labs due in October  - CBC Auto Differential; Future  - Comprehensive Metabolic Panel; Future  - Lipid Panel; Future    6. Peripheral arterial disease  Managed by Dr. Trejo    7. Noncompliance with medication treatment due to difficulty with dosing  Home health order for medication education  - Ambulatory referral/consult to Home Health; Future

## 2022-08-03 NOTE — PATIENT INSTRUCTIONS
Home health will call you to restart services. Call Medicaid and ask about in home caregivers for assistance with meal preparation, shopping, medication management, light house work.

## 2022-08-05 PROCEDURE — G0180 PR HOME HEALTH MD CERTIFICATION: ICD-10-PCS | Mod: ,,, | Performed by: NURSE PRACTITIONER

## 2022-08-05 PROCEDURE — G0180 MD CERTIFICATION HHA PATIENT: HCPCS | Mod: ,,, | Performed by: NURSE PRACTITIONER

## 2022-08-25 ENCOUNTER — EXTERNAL HOME HEALTH (OUTPATIENT)
Dept: HOME HEALTH SERVICES | Facility: HOSPITAL | Age: 82
End: 2022-08-25
Payer: MEDICARE

## 2022-09-06 ENCOUNTER — TELEPHONE (OUTPATIENT)
Dept: FAMILY MEDICINE | Facility: CLINIC | Age: 82
End: 2022-09-06
Payer: MEDICARE

## 2022-09-06 DIAGNOSIS — J44.9 CHRONIC OBSTRUCTIVE PULMONARY DISEASE, UNSPECIFIED COPD TYPE: Primary | ICD-10-CM

## 2022-09-06 NOTE — TELEPHONE ENCOUNTER
Call from patient with complaints of a cough and a little short of breath. She is currently using her inhalers but is requesting that you call in Prednisone to [facility].  Patient denies any fever.

## 2022-09-07 RX ORDER — BUDESONIDE, GLYCOPYRROLATE, AND FORMOTEROL FUMARATE 160; 9; 4.8 UG/1; UG/1; UG/1
2 AEROSOL, METERED RESPIRATORY (INHALATION) 2 TIMES DAILY
Qty: 10.7 G | Refills: 11 | Status: SHIPPED | OUTPATIENT
Start: 2022-09-07

## 2022-09-07 NOTE — TELEPHONE ENCOUNTER
She should not need to be taking prednisone so often. I see that she only filled her maintenance inhaler in April. It's the Breztri that she is supposed to use twice daily every day. Without an controller/maintenance inhaler she is going to have continued COPD issues. I will send in a refill of the Breztri. Also I would like to refer her to pulmonology. She has seen Dr Esquivel in the past. Would she like to continue in [place] or in [place]?

## 2022-09-08 NOTE — TELEPHONE ENCOUNTER
Patient called back and notified about message.  Patient was given the number to Dr. Esquivel and stated she would call to schedule an appointment with his office.

## 2022-09-13 ENCOUNTER — TELEPHONE (OUTPATIENT)
Dept: FAMILY MEDICINE | Facility: CLINIC | Age: 82
End: 2022-09-13
Payer: MEDICARE

## 2022-09-13 DIAGNOSIS — J44.9 CHRONIC OBSTRUCTIVE PULMONARY DISEASE, UNSPECIFIED COPD TYPE: Primary | ICD-10-CM

## 2022-09-13 NOTE — TELEPHONE ENCOUNTER
Patient states she called Dr. Esquivel's office and they told her that they would need a referral sent to them. Patient would like to go to the [place] office.

## 2022-09-13 NOTE — TELEPHONE ENCOUNTER
----- Message from Lali Bennett sent at 9/13/2022  2:29 PM CDT -----  Regarding: referral request  Type:  Patient Requesting Referral    Who Called:Betty with [facility]  Does the patient already have the specialty appointment scheduled?:no  If yes, what is the date of that appointment?:  Referral to What Specialty:Pulmonary  Reason for Referral:breathing issues  Does the patient want the referral with a specific physician?:Dr. Esquivel  Is the specialist an [facility]?:yes  Patient Requesting a Response?:yes  Would the patient rather a call back or a response via [facility]?   Best Call Back Number:[phone]  Additional Information: Patient want to be referred to the doctor mentioned above. Please angela patient back.

## 2022-09-29 ENCOUNTER — DOCUMENT SCAN (OUTPATIENT)
Dept: HOME HEALTH SERVICES | Facility: HOSPITAL | Age: 82
End: 2022-09-29
Payer: MEDICARE

## 2022-09-30 ENCOUNTER — TELEPHONE (OUTPATIENT)
Dept: FAMILY MEDICINE | Facility: CLINIC | Age: 82
End: 2022-09-30
Payer: MEDICARE

## 2022-09-30 RX ORDER — KETOCONAZOLE 20 MG/G
CREAM TOPICAL DAILY
Qty: 60 G | Refills: 2 | Status: SHIPPED | OUTPATIENT
Start: 2022-09-30 | End: 2022-11-17

## 2022-09-30 RX ORDER — FLUCONAZOLE 200 MG/1
200 TABLET ORAL DAILY
Qty: 14 TABLET | Refills: 0 | Status: SHIPPED | OUTPATIENT
Start: 2022-09-30 | End: 2022-10-14

## 2022-09-30 NOTE — TELEPHONE ENCOUNTER
Home health communication reviewed, patient with severe rash under breasts, I was using nystatin powder but rash is still there.  Sent prescription for Diflucan and ketoconazole.  Communication sent back to [facility] home health.

## 2022-10-20 ENCOUNTER — TELEPHONE (OUTPATIENT)
Dept: FAMILY MEDICINE | Facility: CLINIC | Age: 82
End: 2022-10-20
Payer: MEDICARE

## 2022-10-20 NOTE — TELEPHONE ENCOUNTER
Are there any outstanding tasks in patient's chart?  labs    2. Do we have outstanding/pending referrals?  n    3. Has the patient been seen in an ER, Urgent Care, or admitted since last visit?  n    4. Has patient seen any other health care providers since last visit?  n    5.  Has patient had any blood work or x-rays done since last visit?   Discharged from [facility] 9/2022.  [facility]    Lab orders faxed on 10/20/22 at 1000

## 2022-10-27 ENCOUNTER — OFFICE VISIT (OUTPATIENT)
Dept: FAMILY MEDICINE | Facility: CLINIC | Age: 82
End: 2022-10-27
Payer: MEDICARE

## 2022-10-27 VITALS
RESPIRATION RATE: 18 BRPM | SYSTOLIC BLOOD PRESSURE: 150 MMHG | TEMPERATURE: 98 F | BODY MASS INDEX: 33.99 KG/M2 | HEIGHT: 61 IN | DIASTOLIC BLOOD PRESSURE: 80 MMHG | OXYGEN SATURATION: 98 % | HEART RATE: 78 BPM | WEIGHT: 180 LBS

## 2022-10-27 DIAGNOSIS — J44.9 CHRONIC OBSTRUCTIVE PULMONARY DISEASE, UNSPECIFIED COPD TYPE: ICD-10-CM

## 2022-10-27 DIAGNOSIS — I73.9 PERIPHERAL ARTERIAL DISEASE: ICD-10-CM

## 2022-10-27 DIAGNOSIS — Z71.89 ADVANCED CARE PLANNING/COUNSELING DISCUSSION: ICD-10-CM

## 2022-10-27 DIAGNOSIS — I25.10 CORONARY ARTERY DISEASE, UNSPECIFIED VESSEL OR LESION TYPE, UNSPECIFIED WHETHER ANGINA PRESENT, UNSPECIFIED WHETHER NATIVE OR TRANSPLANTED HEART: ICD-10-CM

## 2022-10-27 DIAGNOSIS — I50.9 CONGESTIVE HEART FAILURE, UNSPECIFIED HF CHRONICITY, UNSPECIFIED HEART FAILURE TYPE: ICD-10-CM

## 2022-10-27 DIAGNOSIS — I48.91 ATRIAL FIBRILLATION, UNSPECIFIED TYPE: ICD-10-CM

## 2022-10-27 DIAGNOSIS — J96.11 CHRONIC RESPIRATORY FAILURE WITH HYPOXIA: ICD-10-CM

## 2022-10-27 DIAGNOSIS — E78.5 HYPERLIPIDEMIA, UNSPECIFIED HYPERLIPIDEMIA TYPE: Primary | ICD-10-CM

## 2022-10-27 DIAGNOSIS — I10 HYPERTENSION, UNSPECIFIED TYPE: ICD-10-CM

## 2022-10-27 DIAGNOSIS — M54.16 LUMBAR RADICULOPATHY: ICD-10-CM

## 2022-10-27 DIAGNOSIS — Z23 NEED FOR PNEUMOCOCCAL VACCINATION: ICD-10-CM

## 2022-10-27 DIAGNOSIS — Z00.00 MEDICARE ANNUAL WELLNESS VISIT, SUBSEQUENT: ICD-10-CM

## 2022-10-27 DIAGNOSIS — N18.31 CHRONIC KIDNEY DISEASE, STAGE 3A: ICD-10-CM

## 2022-10-27 PROBLEM — N18.9 CHRONIC KIDNEY DISEASE: Status: RESOLVED | Noted: 2022-06-15 | Resolved: 2022-10-27

## 2022-10-27 LAB
ALBUMIN SERPL-MCNC: 3.5 GM/DL (ref 3.4–4.8)
ALBUMIN/GLOB SERPL: 1.2 RATIO (ref 1.1–2)
ALP SERPL-CCNC: 101 UNIT/L (ref 40–150)
ALT SERPL-CCNC: 7 UNIT/L (ref 0–55)
AST SERPL-CCNC: 13 UNIT/L (ref 5–34)
BASOPHILS # BLD AUTO: 0.05 X10(3)/MCL (ref 0–0.2)
BASOPHILS NFR BLD AUTO: 0.9 %
BILIRUBIN DIRECT+TOT PNL SERPL-MCNC: 0.5 MG/DL
BUN SERPL-MCNC: 17.5 MG/DL (ref 9.8–20.1)
CALCIUM SERPL-MCNC: 9.2 MG/DL (ref 8.4–10.2)
CHLORIDE SERPL-SCNC: 103 MMOL/L (ref 98–107)
CHOLEST SERPL-MCNC: 185 MG/DL
CHOLEST/HDLC SERPL: 3 {RATIO} (ref 0–5)
CO2 SERPL-SCNC: 27 MMOL/L (ref 23–31)
CREAT SERPL-MCNC: 0.87 MG/DL (ref 0.55–1.02)
EOSINOPHIL # BLD AUTO: 0.91 X10(3)/MCL (ref 0–0.9)
EOSINOPHIL NFR BLD AUTO: 15.6 %
ERYTHROCYTE [DISTWIDTH] IN BLOOD BY AUTOMATED COUNT: 15.5 % (ref 11.5–17)
GFR SERPLBLD CREATININE-BSD FMLA CKD-EPI: >60 MLS/MIN/1.73/M2
GLOBULIN SER-MCNC: 3 GM/DL (ref 2.4–3.5)
GLUCOSE SERPL-MCNC: 91 MG/DL (ref 82–115)
HCT VFR BLD AUTO: 39.1 % (ref 37–47)
HDLC SERPL-MCNC: 54 MG/DL (ref 35–60)
HGB BLD-MCNC: 11.9 GM/DL (ref 12–16)
IMM GRANULOCYTES # BLD AUTO: 0.01 X10(3)/MCL (ref 0–0.04)
IMM GRANULOCYTES NFR BLD AUTO: 0.2 %
LDLC SERPL CALC-MCNC: 107 MG/DL (ref 50–140)
LYMPHOCYTES # BLD AUTO: 0.85 X10(3)/MCL (ref 0.6–4.6)
LYMPHOCYTES NFR BLD AUTO: 14.6 %
MCH RBC QN AUTO: 29.2 PG (ref 27–31)
MCHC RBC AUTO-ENTMCNC: 30.4 MG/DL (ref 33–36)
MCV RBC AUTO: 95.8 FL (ref 80–94)
MONOCYTES # BLD AUTO: 0.53 X10(3)/MCL (ref 0.1–1.3)
MONOCYTES NFR BLD AUTO: 9.1 %
NEUTROPHILS # BLD AUTO: 3.5 X10(3)/MCL (ref 2.1–9.2)
NEUTROPHILS NFR BLD AUTO: 59.6 %
PLATELET # BLD AUTO: 308 X10(3)/MCL (ref 130–400)
PMV BLD AUTO: 9.7 FL (ref 7.4–10.4)
POTASSIUM SERPL-SCNC: 4.1 MMOL/L (ref 3.5–5.1)
PROT SERPL-MCNC: 6.5 GM/DL (ref 5.8–7.6)
RBC # BLD AUTO: 4.08 X10(6)/MCL (ref 4.2–5.4)
SODIUM SERPL-SCNC: 143 MMOL/L (ref 136–145)
TRIGL SERPL-MCNC: 121 MG/DL (ref 37–140)
VLDLC SERPL CALC-MCNC: 24 MG/DL
WBC # SPEC AUTO: 5.8 X10(3)/MCL (ref 4.5–11.5)

## 2022-10-27 PROCEDURE — 1160F RVW MEDS BY RX/DR IN RCRD: CPT | Mod: CPTII,,, | Performed by: NURSE PRACTITIONER

## 2022-10-27 PROCEDURE — 85025 COMPLETE CBC W/AUTO DIFF WBC: CPT | Performed by: NURSE PRACTITIONER

## 2022-10-27 PROCEDURE — 1160F PR REVIEW ALL MEDS BY PRESCRIBER/CLIN PHARMACIST DOCUMENTED: ICD-10-PCS | Mod: CPTII,,, | Performed by: NURSE PRACTITIONER

## 2022-10-27 PROCEDURE — 90677 PNEUMOCOCCAL CONJUGATE VACCINE 20-VALENT: ICD-10-PCS | Mod: ,,, | Performed by: NURSE PRACTITIONER

## 2022-10-27 PROCEDURE — 3079F PR MOST RECENT DIASTOLIC BLOOD PRESSURE 80-89 MM HG: ICD-10-PCS | Mod: CPTII,,, | Performed by: NURSE PRACTITIONER

## 2022-10-27 PROCEDURE — G0439 PR MEDICARE ANNUAL WELLNESS SUBSEQUENT VISIT: ICD-10-PCS | Mod: ,,, | Performed by: NURSE PRACTITIONER

## 2022-10-27 PROCEDURE — 1126F PR PAIN SEVERITY QUANTIFIED, NO PAIN PRESENT: ICD-10-PCS | Mod: CPTII,,, | Performed by: NURSE PRACTITIONER

## 2022-10-27 PROCEDURE — 80061 LIPID PANEL: CPT | Performed by: NURSE PRACTITIONER

## 2022-10-27 PROCEDURE — 80053 COMPREHEN METABOLIC PANEL: CPT | Performed by: NURSE PRACTITIONER

## 2022-10-27 PROCEDURE — 1101F PT FALLS ASSESS-DOCD LE1/YR: CPT | Mod: CPTII,,, | Performed by: NURSE PRACTITIONER

## 2022-10-27 PROCEDURE — 36415 COLL VENOUS BLD VENIPUNCTURE: CPT | Performed by: NURSE PRACTITIONER

## 2022-10-27 PROCEDURE — G0439 PPPS, SUBSEQ VISIT: HCPCS | Mod: ,,, | Performed by: NURSE PRACTITIONER

## 2022-10-27 PROCEDURE — 3077F PR MOST RECENT SYSTOLIC BLOOD PRESSURE >= 140 MM HG: ICD-10-PCS | Mod: CPTII,,, | Performed by: NURSE PRACTITIONER

## 2022-10-27 PROCEDURE — G0009 ADMIN PNEUMOCOCCAL VACCINE: HCPCS | Mod: ,,, | Performed by: NURSE PRACTITIONER

## 2022-10-27 PROCEDURE — 3288F FALL RISK ASSESSMENT DOCD: CPT | Mod: CPTII,,, | Performed by: NURSE PRACTITIONER

## 2022-10-27 PROCEDURE — 1126F AMNT PAIN NOTED NONE PRSNT: CPT | Mod: CPTII,,, | Performed by: NURSE PRACTITIONER

## 2022-10-27 PROCEDURE — 1159F MED LIST DOCD IN RCRD: CPT | Mod: CPTII,,, | Performed by: NURSE PRACTITIONER

## 2022-10-27 PROCEDURE — 3288F PR FALLS RISK ASSESSMENT DOCUMENTED: ICD-10-PCS | Mod: CPTII,,, | Performed by: NURSE PRACTITIONER

## 2022-10-27 PROCEDURE — 3079F DIAST BP 80-89 MM HG: CPT | Mod: CPTII,,, | Performed by: NURSE PRACTITIONER

## 2022-10-27 PROCEDURE — 3077F SYST BP >= 140 MM HG: CPT | Mod: CPTII,,, | Performed by: NURSE PRACTITIONER

## 2022-10-27 PROCEDURE — 1159F PR MEDICATION LIST DOCUMENTED IN MEDICAL RECORD: ICD-10-PCS | Mod: CPTII,,, | Performed by: NURSE PRACTITIONER

## 2022-10-27 PROCEDURE — G0009 PNEUMOCOCCAL CONJUGATE VACCINE 20-VALENT: ICD-10-PCS | Mod: ,,, | Performed by: NURSE PRACTITIONER

## 2022-10-27 PROCEDURE — 1101F PR PT FALLS ASSESS DOC 0-1 FALLS W/OUT INJ PAST YR: ICD-10-PCS | Mod: CPTII,,, | Performed by: NURSE PRACTITIONER

## 2022-10-27 PROCEDURE — 90677 PCV20 VACCINE IM: CPT | Mod: ,,, | Performed by: NURSE PRACTITIONER

## 2022-10-27 PROCEDURE — 1157F PR ADVANCE CARE PLAN OR EQUIV PRESENT IN MEDICAL RECORD: ICD-10-PCS | Mod: CPTII,,, | Performed by: NURSE PRACTITIONER

## 2022-10-27 PROCEDURE — 1157F ADVNC CARE PLAN IN RCRD: CPT | Mod: CPTII,,, | Performed by: NURSE PRACTITIONER

## 2022-10-27 RX ORDER — FENOFIBRATE 54 MG/1
54 TABLET ORAL DAILY
COMMUNITY
End: 2022-11-30

## 2022-10-27 NOTE — ASSESSMENT & PLAN NOTE
Long discussion today about advance care planning, goals of care.  Patient is aware that she had labs significant, chronic, non curable issues such as chronic respiratory failure, COPD, mediastinal mass.  Patient does not want to go full palliative care but also does not want any burden some treatments.  LA post completed today, will send to Dr. Shepard to sign off on.  Patient wishes to be a DNR.  Also completed living will and healthcare power of .

## 2022-10-27 NOTE — Clinical Note
I sat with patient and completed the whole [place] but I cannot sign it because I am not a physician. Can you please sign? The patient signed while in the office. I can walk you through where to sign if you need

## 2022-10-27 NOTE — ASSESSMENT & PLAN NOTE
Cardiology notes say the patient is on a statin and fenofibrate but statin is not listed in chart.  Patient has not filled any statin in the past year. Recent records requested from cardiology. Lipid panel today. Will plan to restart statin.

## 2022-10-27 NOTE — PROGRESS NOTES
Patient ID: [id number]     Chief Complaint: Medicare AWV      HPI:     Jaylin Marcum is a 82 y.o. female here today for a Medicare Wellness. No other complaints today.       ----------------------------  Anxiety disorder, unspecified  CHF (congestive heart failure)  Chronic obstructive lung disease  CKD (chronic kidney disease)  DVT (deep venous thrombosis)  Gout, unspecified  Hyperlipidemia  Hypertensive disorder  Lumbar radiculopathy  Mediastinal mass  On home oxygen therapy  Paroxysmal atrial fibrillation  Peripheral artery disease  Postmenopausal state     Past Surgical History:   Procedure Laterality Date    APPENDECTOMY      DILATION AND CURETTAGE OF UTERUS         Review of patient's allergies indicates:   Allergen Reactions    Penicillin Rash    Codeine Itching       Outpatient Medications Marked as Taking for the 10/27/22 encounter (Office Visit) with REESE Collier   Medication Sig Dispense Refill    acetaminophen (TYLENOL) 650 MG TbSR Take 650 mg by mouth every 8 (eight) hours.      albuterol (PROVENTIL) 2.5 mg /3 mL (0.083 %) nebulizer solution       albuterol (PROVENTIL/VENTOLIN HFA) 90 mcg/actuation inhaler Inhale 2 puffs into the lungs every 4 (four) hours. 6 g 11    allopurinoL (ZYLOPRIM) 100 MG tablet TAKE ONE TABLET BY MOUTH DAILY for gout 30 tablet 5    amLODIPine (NORVASC) 10 MG tablet Take 10 mg by mouth once daily.      BREZTRI AEROSPHERE 160-9-4.8 mcg/actuation HFAA Inhale 2 puffs into the lungs 2 (two) times a day. 10.7 g 11    cetirizine (ZYRTEC) 10 MG tablet TAKE ONE TABLET BY MOUTH IN THE EVENING 30 tablet 5    doxepin (SINEQUAN) 25 MG capsule TAKE ONE CAPSULE BY MOUTH IN THE EVENING 30 capsule 5    ELIQUIS 5 mg Tab Take 5 mg by mouth 2 (two) times daily.      fenofibrate (TRICOR) 54 MG tablet Take 54 mg by mouth once daily.      ferrous sulfate (FEOSOL) 325 mg (65 mg iron) Tab tablet Take 325 mg by mouth.      fluticasone propionate (FLONASE) 50 mcg/actuation nasal spray Use 2  sprays in each nostril once daily 16 g 5    furosemide (LASIX) 40 MG tablet TAKE ONE TABLET BY MOUTH IN THE MORNING and at 2 pm 60 tablet 5    ketoconazole (NIZORAL) 2 % cream Apply topically once daily. 60 g 2    LORazepam (ATIVAN) 1 MG tablet TAKE ONE TABLET BY MOUTH TWICE DAILY 60 tablet 2    methocarbamoL (ROBAXIN) 750 MG Tab TAKE ONE TABLET BY MOUTH THREE TIMES DAILY AS NEEDED for back pain and spasm      metoprolol succinate (TOPROL-XL) 100 MG 24 hr tablet Take 100 mg by mouth once daily. Take 1 tab in AM and take 1/2 tab in evening      montelukast (SINGULAIR) 10 mg tablet TAKE ONE TABLET BY MOUTH DAILY 30 tablet 5    nystatin (MYCOSTATIN) powder Apply topically 4 (four) times daily. 60 g 5    pantoprazole (PROTONIX) 40 MG tablet TAKE ONE TABLET BY MOUTH DAILY 30 tablet 5    pramipexole (MIRAPEX) 0.25 MG tablet TAKE ONE TABLET BY MOUTH IN THE EVENING 30 tablet 5    pregabalin (LYRICA) 75 MG capsule TAKE ONE CAPSULE BY MOUTH TWICE DAILY 60 capsule 2    theophylline 400 mg Tb24 TAKE ONE-HALF TABLET BY MOUTH TWICE DAILY 15 tablet 5       Social History     Socioeconomic History    Marital status:    Tobacco Use    Smoking status: Former     Types: Cigarettes    Smokeless tobacco: Never   Substance and Sexual Activity    Alcohol use: Not Currently    Drug use: Never    Sexual activity: Not Currently        Family History   Problem Relation Age of Onset    Lung cancer Mother     Brain cancer Mother     Heart disease Father     Heart disease Brother         Patient Care Team:  REESE Collier as PCP - General (Nurse Practitioner)  Jason Lugo MD as Consulting Physician (Cardiovascular Disease)  Lalo Esquivel MD as Consulting Physician (Pulmonary Disease)  Pernell Ryo MD as Consulting Physician (Cardiothoracic Surgery)  Scar Culp MD as Consulting Physician (Nephrology)       Subjective:     Review of Systems   Constitutional: Negative.    HENT: Negative.     Eyes: Negative.     Respiratory: Negative.     Cardiovascular: Negative.    Gastrointestinal: Negative.    Genitourinary: Negative.    Musculoskeletal: Negative.    Skin: Negative.    Neurological: Negative.    Endo/Heme/Allergies: Negative.    Psychiatric/Behavioral: Negative.     All other systems reviewed and are negative.      Patient Reported Health Risk Assessment  What is your age?: 80 or older  Are you male or female?: Female  During the past four weeks, how much have you been bothered by emotional problems such as feeling anxious, depressed, irritable, sad, or downhearted and blue?: Slightly  During the past five weeks, has your physical and/or emotional health limited your social activities with family, friends, neighbors, or groups?: Not at all  During the past four weeks, how much bodily pain have you generally had?: Moderate pain  During the past four weeks, was someone available to help if you needed and wanted help?: Yes, as much as I wanted  During the past four weeks, what was the hardest physical activity you could do for at least two minutes?: Moderate  Can you get to places out of walking distance without help?  (For example, can you travel alone on buses or taxis, or drive your own car?): Yes  Can you go shopping for groceries or clothes without someone's help?: Yes  Can you prepare your own meals?: Yes  Can you do your own housework without help?: Yes  Because of any health problems, do you need the help of another person with your personal care needs such as eating, bathing, dressing, or getting around the house?: No  Can you handle your own money without help?: Yes  During the past four weeks, how would you rate your health in general?: Fair  How have things been going for you during the past four weeks?: Good and bad parts about equal  Are you having difficulties driving your car?: Not applicable, I do not use a car  Do you always fasten your seat belt when you are in a car?: Yes, usually  How often in the  "past four weeks have you been bothered by falling or dizzy when standing up?: Sometimes  How often in the past four weeks have you been bothered by sexual problems?: Never  How often in the past four weeks have you been bothered by trouble eating well?: Never  How often in the past four weeks have you been bothered by teeth or denture problems?: Never  How often in the past four weeks have you been bothered with problems using the telephone?: Sometimes  How often in the past four weeks have you been bothered by tiredness or fatigue?: Sometimes  Have you fallen two or more times in the past year?: No  Are you afraid of falling?: Yes  Are you a smoker?: No  During the past four weeks, how many drinks of wine, beer, or other alcoholic beverages did you have?: No alcohol at all  Do you exercise for about 20 minutes three or more days a week?: Yes, some of the time  Have you been given any information to help you with hazards in your house that might hurt you?: No  Have you been given any information to help you with keeping track of your medications?: No  How often do you have trouble taking medicines the way you've been told to take them?: Sometimes I take them as prescribed  How confident are you that you can control and manage most of your health problems?: Very confident  What is your race? (Check all that apply.):     Opioid Screening: Patient medication list reviewed, patient is not taking prescription opioids. Patient is not using additional opioids than prescribed. Patient is at low risk of substance abuse based on this opioid use history.      Objective:     BP (!) 150/80 (BP Location: Left arm)   Pulse 78   Temp 98 °F (36.7 °C) (Oral)   Resp 18   Ht 5' 1" (1.549 m)   Wt 81.6 kg (180 lb)   SpO2 98%   BMI 34.01 kg/m²     Physical Exam  Vitals and nursing note reviewed.   Constitutional:       Appearance: She is normal weight.      Comments: Frail, on home O2    HENT:      Head: Normocephalic and " atraumatic.      Right Ear: Tympanic membrane, ear canal and external ear normal.      Left Ear: Tympanic membrane, ear canal and external ear normal.      Nose: Nose normal.      Mouth/Throat:      Mouth: Mucous membranes are moist.      Pharynx: Oropharynx is clear.   Eyes:      Extraocular Movements: Extraocular movements intact.      Conjunctiva/sclera: Conjunctivae normal.      Pupils: Pupils are equal, round, and reactive to light.   Cardiovascular:      Rate and Rhythm: Normal rate and regular rhythm.      Heart sounds: Murmur heard.   Pulmonary:      Effort: Pulmonary effort is normal.      Breath sounds: Normal breath sounds.   Abdominal:      General: Abdomen is flat. Bowel sounds are normal.      Palpations: Abdomen is soft.   Musculoskeletal:         General: Normal range of motion.      Cervical back: Normal range of motion and neck supple.   Skin:     General: Skin is warm and dry.   Neurological:      General: No focal deficit present.      Mental Status: She is alert and oriented to person, place, and time.   Psychiatric:         Mood and Affect: Mood normal.         Behavior: Behavior normal.         Thought Content: Thought content normal.         Judgment: Judgment normal.         No flowsheet data found.  Fall Risk Assessment - Outpatient 10/27/2022 8/3/2022 6/15/2022   Mobility Status Ambulatory Ambulatory Ambulatory w/ assistance   Number of falls 0 2+ 2+   Identified as fall risk 0 1 1           Depression Screening  Over the past two weeks, has the patient felt down, depressed, or hopeless?: No  Over the past two weeks, has the patient felt little interest or pleasure in doing things?: No  Functional Ability/Safety Screening  Was the patient's timed Up & Go test unsteady or longer than 30 seconds?: No  Does the patient need help with phone, transportation, shopping, preparing meals, housework, laundry, meds, or managing money?: No  Does the patient's home have rugs in the hallway, lack grab  bars in the bathroom, lack handrails on the stairs or have poor lighting?: No  Have you noticed any hearing difficulties?: No  Cognitive Function (Assessed through direct observation with due consideration of information obtained by way of patient reports and/or concerns raised by family, friends, caretakers, or others)    Does the patient repeat questions/statements in the same day?: No  Does the patient have trouble remembering the date, year, and time?: No  Does the patient have difficulty managing finances?: No  Does the patient have a decreased sense of direction?: No  Assessment and Plan       ICD-10-CM ICD-9-CM   1. Medicare annual wellness visit, subsequent  Z00.00 V70.0   2. Advanced care planning/counseling discussion  Z71.89 V65.49   3. Chronic obstructive pulmonary disease, unspecified COPD type  J44.9 496   4. Chronic respiratory failure with hypoxia  J96.11 518.83     799.02   5. Chronic kidney disease, stage 3a  N18.31 585.3   6. Need for pneumococcal vaccination  Z23 V03.82   7. Hyperlipidemia, unspecified hyperlipidemia type  E78.5 272.4   8. Lumbar radiculopathy  M54.16 724.4   9. Congestive heart failure, unspecified HF chronicity, unspecified heart failure type  I50.9 428.0   10. Hypertension, unspecified type  I10 401.9   11. Peripheral arterial disease  I73.9 443.9   12. Coronary artery disease, unspecified vessel or lesion type, unspecified whether angina present, unspecified whether native or transplanted heart  I25.10 414.00   13. Atrial fibrillation, unspecified type  I48.91 427.31     1. Medicare annual wellness visit, subsequent  Overview:  Medicare annual wellness visit yearly in October 2. Advanced care planning/counseling discussion  Assessment & Plan:  Long discussion today about advance care planning, goals of care.  Patient is aware that she had labs significant, chronic, non curable issues such as chronic respiratory failure, COPD, mediastinal mass.  Patient does not want to  go full palliative care but also does not want any burden some treatments.  LA post completed today, will send to Dr. Shepard to sign off on.  Patient wishes to be a DNR.  Also completed living will and healthcare power of .      3. Chronic obstructive pulmonary disease, unspecified COPD type  Overview:  Dr Esquivel    Orders:  -     CBC Auto Differential  -     Comprehensive Metabolic Panel    4. Chronic respiratory failure with hypoxia  Overview:  Dr Esquivel  Home O2 ordered 10/12/2022    Orders:  -     CBC Auto Differential  -     Comprehensive Metabolic Panel    5. Chronic kidney disease, stage 3a  Overview:  Dr Culp    Assessment & Plan:  CMP today  Most recent records from Dr Culp requested  Avoid nephrotoxic drugs     Orders:  -     CBC Auto Differential  -     Comprehensive Metabolic Panel    6. Need for pneumococcal vaccination  -     (In Office Administered) Pneumococcal Conjugate Vaccine (20 Valent) (IM)    7. Hyperlipidemia, unspecified hyperlipidemia type  Assessment & Plan:  Cardiology notes say the patient is on a statin and fenofibrate but statin is not listed in chart.  Patient has not filled any statin in the past year. Recent records requested from cardiology. Lipid panel today. Will plan to restart statin.     Orders:  -     Lipid Panel    8. Lumbar radiculopathy  Overview:  L spine Xray 07/15/2021 - no compression deformities, moderate degree of spondylosis  Lyrica 75 mg BID    Assessment & Plan:  Stable, pain well controlled with Lyrica 75 mg twice daily, continue Lyrica, follow-up 6 months.      9. Congestive heart failure, unspecified HF chronicity, unspecified heart failure type  Overview:  Dr Lugo   Last Echo 12/29/2020 - LVEF 35-40%  Off Entresto due to MARQUITA  Nephrology manages diuretics    Assessment & Plan:  Patient states that she has seen Dr. Lugo this year, records requested.      10. Hypertension, unspecified type  Overview:  Amlodipine 10 mg daily  Metoprolol   mg tablet 1 tablet in the morning and half a tablet in the evening      Assessment & Plan:  Blood pressure mildly elevated at 150/80 here in clinic.  Nephrology aware and also monitoring.      11. Peripheral arterial disease  Overview:  Dr Roy with previous intervention    Assessment & Plan:  Patient admits that she has not seen Dr. Roy in a couple of years, patient given phone number for Dr. Roy and will be calling to schedule an appointment.      12. Coronary artery disease, unspecified vessel or lesion type, unspecified whether angina present, unspecified whether native or transplanted heart  Overview:  Dr Lugo  Cath 3/2021 with branch vessel disease and moderate RCA - no PCI needed       13. Atrial fibrillation, unspecified type  Overview:  Dr Brittney Wright 5 mg BID  Metoprolol 100 mg in AM and 50 mg PM    Assessment & Plan:  Most recent records requested from Cardiology                Medicare Annual Wellness and Personalized Prevention Plan:   Fall Risk + Home Safety + Hearing Impairment + Depression Screen + Cognitive Impairment Screen + Health Risk Assessment all reviewed.       Health Maintenance Topics with due status: Not Due       Topic Last Completion Date    Lipid Panel 10/13/2021      The patient's Health Maintenance was reviewed and the following appears to be due at this time:   There are no preventive care reminders to display for this patient.      Advance Care Planning   Advanced Care Planning: I offered to discuss advanced care planning, including how to pick a person who would make decisions for you if you were unable to make them for yourself, called a health care power of , and what kind of decisions you might make such as use of life sustaining treatments such as ventilators and tube feeding when faced with a life limiting illness recorded on a living will that they will need to know. (How you want to be cared for as you near the end of your natural life).  Spent 20  minutes with the patient/family on the importance of Advanced Care Planning.        Opioid Screening: Patient medication list reviewed, patient is not taking prescription opioids. Patient is not using additional opioids than prescribed. Patient is at low risk of substance abuse based on this opioid use history.     Medication List with Changes/Refills   Current Medications    ACETAMINOPHEN (TYLENOL) 650 MG TBSR    Take 650 mg by mouth every 8 (eight) hours.       Start Date: --        End Date: --    ALBUTEROL (PROVENTIL) 2.5 MG /3 ML (0.083 %) NEBULIZER SOLUTION           Start Date: 4/27/2022 End Date: --    ALBUTEROL (PROVENTIL/VENTOLIN HFA) 90 MCG/ACTUATION INHALER    Inhale 2 puffs into the lungs every 4 (four) hours.       Start Date: 9/8/2022  End Date: 12/7/2022    ALLOPURINOL (ZYLOPRIM) 100 MG TABLET    TAKE ONE TABLET BY MOUTH DAILY for gout       Start Date: 9/14/2022 End Date: --    AMLODIPINE (NORVASC) 10 MG TABLET    Take 10 mg by mouth once daily.       Start Date: 4/25/2022 End Date: --    BREZTRI AEROSPHERE 160-9-4.8 MCG/ACTUATION HFAA    Inhale 2 puffs into the lungs 2 (two) times a day.       Start Date: 9/7/2022  End Date: --    CETIRIZINE (ZYRTEC) 10 MG TABLET    TAKE ONE TABLET BY MOUTH IN THE EVENING       Start Date: 6/1/2022  End Date: --    DOXEPIN (SINEQUAN) 25 MG CAPSULE    TAKE ONE CAPSULE BY MOUTH IN THE EVENING       Start Date: 6/17/2022 End Date: --    ELIQUIS 5 MG TAB    Take 5 mg by mouth 2 (two) times daily.       Start Date: 5/2/2022  End Date: --    FENOFIBRATE (TRICOR) 54 MG TABLET    Take 54 mg by mouth once daily.       Start Date: --        End Date: --    FERROUS SULFATE (FEOSOL) 325 MG (65 MG IRON) TAB TABLET    Take 325 mg by mouth.       Start Date: 7/28/2021 End Date: --    FLUTICASONE PROPIONATE (FLONASE) 50 MCG/ACTUATION NASAL SPRAY    Use 2 sprays in each nostril once daily       Start Date: 6/29/2022 End Date: --    FUROSEMIDE (LASIX) 40 MG TABLET    TAKE ONE TABLET  BY MOUTH IN THE MORNING and at 2 pm       Start Date: 5/25/2022 End Date: --    KETOCONAZOLE (NIZORAL) 2 % CREAM    Apply topically once daily.       Start Date: 9/30/2022 End Date: --    LORAZEPAM (ATIVAN) 1 MG TABLET    TAKE ONE TABLET BY MOUTH TWICE DAILY       Start Date: 10/3/2022 End Date: --    METHOCARBAMOL (ROBAXIN) 750 MG TAB    TAKE ONE TABLET BY MOUTH THREE TIMES DAILY AS NEEDED for back pain and spasm       Start Date: 1/11/2022 End Date: --    METOPROLOL SUCCINATE (TOPROL-XL) 100 MG 24 HR TABLET    Take 100 mg by mouth once daily. Take 1 tab in AM and take 1/2 tab in evening       Start Date: 4/28/2022 End Date: --    MONTELUKAST (SINGULAIR) 10 MG TABLET    TAKE ONE TABLET BY MOUTH DAILY       Start Date: 6/17/2022 End Date: --    NYSTATIN (MYCOSTATIN) POWDER    Apply topically 4 (four) times daily.       Start Date: 6/15/2022 End Date: --    PANTOPRAZOLE (PROTONIX) 40 MG TABLET    TAKE ONE TABLET BY MOUTH DAILY       Start Date: 6/15/2022 End Date: --    PRAMIPEXOLE (MIRAPEX) 0.25 MG TABLET    TAKE ONE TABLET BY MOUTH IN THE EVENING       Start Date: 6/15/2022 End Date: --    PREGABALIN (LYRICA) 75 MG CAPSULE    TAKE ONE CAPSULE BY MOUTH TWICE DAILY       Start Date: 8/31/2022 End Date: --    THEOPHYLLINE 400 MG TB24    TAKE ONE-HALF TABLET BY MOUTH TWICE DAILY       Start Date: 10/4/2022 End Date: --        Follow up in about 6 months (around 4/27/2023) for follow up. In addition to their scheduled follow up, the patient has also been instructed to follow up on as needed basis.     I have personally reviewed REESE Lopez history, exam and medical decision making and agree with the assessment and plan as written.

## 2022-10-28 ENCOUNTER — TELEPHONE (OUTPATIENT)
Dept: FAMILY MEDICINE | Facility: CLINIC | Age: 82
End: 2022-10-28
Payer: MEDICARE

## 2022-10-28 DIAGNOSIS — E78.5 HYPERLIPIDEMIA, UNSPECIFIED HYPERLIPIDEMIA TYPE: Primary | ICD-10-CM

## 2022-10-28 RX ORDER — ATORVASTATIN CALCIUM 20 MG/1
20 TABLET, FILM COATED ORAL DAILY
Qty: 30 TABLET | Refills: 11 | Status: SHIPPED | OUTPATIENT
Start: 2022-10-28 | End: 2023-10-28

## 2022-10-28 NOTE — TELEPHONE ENCOUNTER
Pt notified of lab results and also informed about the Statin medication being sent to her pharmacy. Pt verbalized understanding.

## 2022-10-28 NOTE — TELEPHONE ENCOUNTER
----- Message from REESE Collier sent at 10/28/2022  8:23 AM CDT -----  Labs all OK but did notice yesterday that she is not on any statin. Her cardiology notes say she should be. I will send order for atorvastatin 20 mg daily.

## 2022-10-28 NOTE — PROGRESS NOTES
Labs all OK but did notice yesterday that she is not on any statin. Her cardiology notes say she should be. I will send order for atorvastatin 20 mg daily.

## 2023-01-06 ENCOUNTER — TELEPHONE (OUTPATIENT)
Dept: FAMILY MEDICINE | Facility: CLINIC | Age: 83
End: 2023-01-06
Payer: MEDICARE

## 2023-01-06 DIAGNOSIS — M54.16 LUMBAR RADICULOPATHY: Primary | ICD-10-CM

## 2023-01-06 NOTE — TELEPHONE ENCOUNTER
I cannot give her anything stronger for pain.  I put in a referral to headache and pain center in [place].

## 2023-01-06 NOTE — TELEPHONE ENCOUNTER
----- Message from Rosalie Calderon MA sent at 1/6/2023  9:50 AM CST -----  Regarding: FW: med advice    ----- Message -----  From: Lalitha Nieto  Sent: 1/6/2023   9:43 AM CST  To: Lisa BURNS Staff  Subject: med advice                                       .Type:  Needs Medical Advice    Who Called: pt  Symptoms (please be specific): pain in both legs   How long has patient had these symptoms:  hurting for weeks  Pharmacy name and phone #:  [facility] [address].  Would the patient rather a call back or a response via [facility]? Call back  Best Call Back Number: [phone]  Additional Information: pt is requesting stronger meds for leg pain.

## 2023-01-30 PROBLEM — J96.11 CHRONIC RESPIRATORY FAILURE WITH HYPOXIA: Status: RESOLVED | Noted: 2022-10-27 | Resolved: 2023-01-30

## 2023-01-30 PROBLEM — Z00.00 MEDICARE ANNUAL WELLNESS VISIT, SUBSEQUENT: Status: RESOLVED | Noted: 2022-10-27 | Resolved: 2023-01-30

## 2023-02-27 ENCOUNTER — TELEPHONE (OUTPATIENT)
Dept: FAMILY MEDICINE | Facility: CLINIC | Age: 83
End: 2023-02-27
Payer: MEDICARE

## 2023-02-27 NOTE — TELEPHONE ENCOUNTER
----- Message from Endy Bowman sent at 2/27/2023 10:26 AM CST -----  Regarding: call back  .Type:  Needs Medical Advice    Who Called: santy  Would the patient rather a call back or a response via [facility]?   Best Call Back Number: [phone]  Additional Information: pt said she wants a call back from the nurse to discuss what she is allergic to I tried to look but it's saying I don't have access.

## 2023-02-27 NOTE — TELEPHONE ENCOUNTER
Pt called stating her cardiologist recently put her on cephalexin 500mg and ciprofloxacin 750mg and it is causing her to be nauseated. Pt was worried that she may be having an allergic reaction because she couldn't remember which antibiotic she was allergic to. I informed the pt that she is allergic to penicillin, and informed her that nausea is a common side effect of antibiotics. Instructed pt to try to contact her cardiologist that prescribed these medications and also informed her that she can eat yogurt or take an OTC probiotic to try to help with the nausea. Also instructed the pt to make sure she is eating something before taking the antibiotics as that could also be causing some of the nausea. Pt states she has tried calling the cardiologist office to notify them of this issue. She verbalized understanding and will attempt to contact her cardiologist again.

## 2023-02-27 NOTE — LETTER
AUTHORIZATION FOR RELEASE OF   CONFIDENTIAL INFORMATION    Dear Dr. Lugo,    We are seeing Jaylin Marcum, date of birth 1940, in the clinic at [facility]. REESE Black is the patient's PCP. Jaylin Marcum has an outstanding lab/procedure at the time we reviewed her chart. In order to help keep her health information updated, she has authorized us to request the following medical record(s):        (  )  MAMMOGRAM                                      (  )  COLONOSCOPY      (  )  PAP SMEAR                                          (X)  OUTSIDE LAB RESULTS     (  )  DEXA SCAN                                          (  )  EYE EXAM            (  )  FOOT EXAM                                          (  )  ENTIRE RECORD     (  )  OUTSIDE IMMUNIZATIONS                 (X)  Most Recent Office Notes         Please fax records to [facility], Kathryn F Duplantis, FNP, [phone]     If you have any questions, please contact us at [phone].        Patient Name: Jaylin Marcum  : 1940  Patient Phone #: [phone]

## 2023-02-27 NOTE — LETTER
AUTHORIZATION FOR RELEASE OF   CONFIDENTIAL INFORMATION    Dear Dr. Roy,    We are seeing Jaylin Marcum, date of birth 1940, in the clinic at [facility]. REESE Black is the patient's PCP. Jaylin Marcum has an outstanding lab/procedure at the time we reviewed her chart. In order to help keep her health information updated, she has authorized us to request the following medical record(s):        (  )  MAMMOGRAM                                      (  )  COLONOSCOPY      (  )  PAP SMEAR                                          (X)  OUTSIDE LAB RESULTS     (  )  DEXA SCAN                                          (  )  EYE EXAM            (  )  FOOT EXAM                                          (  )  ENTIRE RECORD     (  )  OUTSIDE IMMUNIZATIONS                 (X)  Last Office Notes         Please fax records to [facility], Kathryn F Duplantis, FNP, [phone]     If you have any questions, please contact us at [phone].        Patient Name: Jaylin Marcum  : 1940  Patient Phone #: [phone]

## 2023-04-28 DIAGNOSIS — I50.9 CONGESTIVE HEART FAILURE, UNSPECIFIED HF CHRONICITY, UNSPECIFIED HEART FAILURE TYPE: Primary | ICD-10-CM

## 2023-04-28 RX ORDER — FUROSEMIDE 40 MG/1
TABLET ORAL
Qty: 60 TABLET | Refills: 5 | Status: SHIPPED | OUTPATIENT
Start: 2023-04-28 | End: 2023-07-11

## 2023-05-03 DIAGNOSIS — G62.9 PERIPHERAL POLYNEUROPATHY: ICD-10-CM

## 2023-05-03 RX ORDER — CETIRIZINE HYDROCHLORIDE 10 MG/1
TABLET ORAL
Qty: 30 TABLET | Refills: 5 | Status: SHIPPED | OUTPATIENT
Start: 2023-05-03

## 2023-05-03 RX ORDER — PREGABALIN 75 MG/1
CAPSULE ORAL
Qty: 60 CAPSULE | Refills: 2 | Status: SHIPPED | OUTPATIENT
Start: 2023-05-03 | End: 2023-09-07 | Stop reason: SDUPTHER

## 2023-05-09 RX ORDER — FENOFIBRATE 54 MG/1
54 TABLET ORAL DAILY
Qty: 90 TABLET | Refills: 1 | Status: CANCELLED | OUTPATIENT
Start: 2023-05-09

## 2023-05-11 RX ORDER — DOXEPIN HYDROCHLORIDE 25 MG/1
CAPSULE ORAL
Qty: 30 CAPSULE | Refills: 5 | Status: SHIPPED | OUTPATIENT
Start: 2023-05-11

## 2023-05-11 RX ORDER — PRAMIPEXOLE DIHYDROCHLORIDE 0.25 MG/1
TABLET ORAL
Qty: 30 TABLET | Refills: 5 | Status: SHIPPED | OUTPATIENT
Start: 2023-05-11 | End: 2023-10-23 | Stop reason: SDUPTHER

## 2023-05-11 RX ORDER — FENOFIBRATE 54 MG/1
TABLET ORAL
Qty: 30 TABLET | Refills: 5 | Status: SHIPPED | OUTPATIENT
Start: 2023-05-11

## 2023-05-11 RX ORDER — MONTELUKAST SODIUM 10 MG/1
TABLET ORAL
Qty: 30 TABLET | Refills: 5 | Status: SHIPPED | OUTPATIENT
Start: 2023-05-11

## 2023-05-11 RX ORDER — PANTOPRAZOLE SODIUM 40 MG/1
TABLET, DELAYED RELEASE ORAL
Qty: 30 TABLET | Refills: 5 | Status: SHIPPED | OUTPATIENT
Start: 2023-05-11 | End: 2023-07-11

## 2023-05-26 RX ORDER — FLUTICASONE PROPIONATE 50 MCG
SPRAY, SUSPENSION (ML) NASAL
Qty: 16 G | Refills: 5 | Status: SHIPPED | OUTPATIENT
Start: 2023-05-26 | End: 2023-07-11

## 2023-06-02 ENCOUNTER — TELEPHONE (OUTPATIENT)
Dept: FAMILY MEDICINE | Facility: CLINIC | Age: 83
End: 2023-06-02
Payer: MEDICARE

## 2023-06-02 DIAGNOSIS — F41.1 GENERALIZED ANXIETY DISORDER: Primary | ICD-10-CM

## 2023-06-02 RX ORDER — LORAZEPAM 2 MG/ML
1 CONCENTRATE ORAL EVERY 8 HOURS PRN
Qty: 45 ML | Refills: 0 | Status: SHIPPED | OUTPATIENT
Start: 2023-06-02 | End: 2023-07-11

## 2023-06-02 NOTE — TELEPHONE ENCOUNTER
Patient came in the office today stating that she was discharged from the hospital and notified to come into the office for anxiety.  She cannot swallow pills with her condition and needs a liquid anxiety medication. She stated that she is not able to wait until her appointment

## 2023-06-02 NOTE — TELEPHONE ENCOUNTER
Patient refused to go to the ED per nurse.  States that she went there already and they would not see her.  States she called the GI office and they told her to come here.  States she is afraid to swallow her pills and choke so she wants her anxiety medicine in a liquid form so she can take that 1st and then take her regular medication after she calms down.  Records requested from [facility], strongly encouraged patient to follow-up with gastroenterologist.  Lorazepam oral concentrate called in to pharmacy.

## 2023-06-26 ENCOUNTER — TELEPHONE (OUTPATIENT)
Dept: FAMILY MEDICINE | Facility: CLINIC | Age: 83
End: 2023-06-26
Payer: MEDICARE

## 2023-06-26 NOTE — TELEPHONE ENCOUNTER
----- Message from Padmini Plummer sent at 6/26/2023  8:13 AM CDT -----  Regarding: Appt/ noon Hosptial follow up  .Type:  Needs Medical Advice    Who Called: Angela Marcum - Daughter In Law  Symptoms (please be specific):    How long has patient had these symptoms:    Pharmacy name and phone #:    Would the patient rather a call back or a response via [facility]?   Best Call Back Number: [phone]  Additional Information: Pt has a new care giver - Caregiver Needs a noon appt in That 7 Days of hospital D/c - Pt was D/c on 6/25/ from [facility] - Also Caregiver needs Medication education on what Meds Pt should be taking

## 2023-06-26 NOTE — TELEPHONE ENCOUNTER
Spoke with the pt's daughter-esau and she states the pt was discharged from the hospital in [place] on 6/25/23 for anxiety and unable to swallow and eat, and was having kidney pain. Was admitted on 6/23/23. Was requesting earlier appt than 7/11/23 as they have taken over care of the pt and are not current on what is going on with her medical issues. Informed her that the appt given is the first available, but we would notify her if something opened up sooner. She verbalized understanding.

## 2023-07-11 ENCOUNTER — OFFICE VISIT (OUTPATIENT)
Dept: FAMILY MEDICINE | Facility: CLINIC | Age: 83
End: 2023-07-11
Payer: MEDICARE

## 2023-07-11 VITALS
RESPIRATION RATE: 16 BRPM | TEMPERATURE: 99 F | HEART RATE: 71 BPM | DIASTOLIC BLOOD PRESSURE: 74 MMHG | HEIGHT: 61 IN | OXYGEN SATURATION: 97 % | BODY MASS INDEX: 33 KG/M2 | SYSTOLIC BLOOD PRESSURE: 110 MMHG | WEIGHT: 174.81 LBS

## 2023-07-11 DIAGNOSIS — N18.31 CHRONIC KIDNEY DISEASE, STAGE 3A: ICD-10-CM

## 2023-07-11 DIAGNOSIS — E87.6 HYPOKALEMIA: ICD-10-CM

## 2023-07-11 DIAGNOSIS — R13.19 OTHER DYSPHAGIA: ICD-10-CM

## 2023-07-11 DIAGNOSIS — I10 PRIMARY HYPERTENSION: ICD-10-CM

## 2023-07-11 DIAGNOSIS — I48.91 ATRIAL FIBRILLATION, UNSPECIFIED TYPE: ICD-10-CM

## 2023-07-11 DIAGNOSIS — J44.9 CHRONIC OBSTRUCTIVE PULMONARY DISEASE, UNSPECIFIED COPD TYPE: ICD-10-CM

## 2023-07-11 DIAGNOSIS — F41.1 GENERALIZED ANXIETY DISORDER: ICD-10-CM

## 2023-07-11 DIAGNOSIS — J98.59 MEDIASTINAL MASS: Primary | ICD-10-CM

## 2023-07-11 DIAGNOSIS — I50.9 CONGESTIVE HEART FAILURE, UNSPECIFIED HF CHRONICITY, UNSPECIFIED HEART FAILURE TYPE: ICD-10-CM

## 2023-07-11 DIAGNOSIS — M54.16 LUMBAR RADICULOPATHY: ICD-10-CM

## 2023-07-11 PROCEDURE — 3074F PR MOST RECENT SYSTOLIC BLOOD PRESSURE < 130 MM HG: ICD-10-PCS | Mod: CPTII,,, | Performed by: NURSE PRACTITIONER

## 2023-07-11 PROCEDURE — 1160F RVW MEDS BY RX/DR IN RCRD: CPT | Mod: CPTII,,, | Performed by: NURSE PRACTITIONER

## 2023-07-11 PROCEDURE — 1157F PR ADVANCE CARE PLAN OR EQUIV PRESENT IN MEDICAL RECORD: ICD-10-PCS | Mod: CPTII,,, | Performed by: NURSE PRACTITIONER

## 2023-07-11 PROCEDURE — 1159F PR MEDICATION LIST DOCUMENTED IN MEDICAL RECORD: ICD-10-PCS | Mod: CPTII,,, | Performed by: NURSE PRACTITIONER

## 2023-07-11 PROCEDURE — 1126F PR PAIN SEVERITY QUANTIFIED, NO PAIN PRESENT: ICD-10-PCS | Mod: CPTII,,, | Performed by: NURSE PRACTITIONER

## 2023-07-11 PROCEDURE — 99215 OFFICE O/P EST HI 40 MIN: CPT | Mod: ,,, | Performed by: NURSE PRACTITIONER

## 2023-07-11 PROCEDURE — 3074F SYST BP LT 130 MM HG: CPT | Mod: CPTII,,, | Performed by: NURSE PRACTITIONER

## 2023-07-11 PROCEDURE — 3078F PR MOST RECENT DIASTOLIC BLOOD PRESSURE < 80 MM HG: ICD-10-PCS | Mod: CPTII,,, | Performed by: NURSE PRACTITIONER

## 2023-07-11 PROCEDURE — 1159F MED LIST DOCD IN RCRD: CPT | Mod: CPTII,,, | Performed by: NURSE PRACTITIONER

## 2023-07-11 PROCEDURE — 1126F AMNT PAIN NOTED NONE PRSNT: CPT | Mod: CPTII,,, | Performed by: NURSE PRACTITIONER

## 2023-07-11 PROCEDURE — 1160F PR REVIEW ALL MEDS BY PRESCRIBER/CLIN PHARMACIST DOCUMENTED: ICD-10-PCS | Mod: CPTII,,, | Performed by: NURSE PRACTITIONER

## 2023-07-11 PROCEDURE — 1157F ADVNC CARE PLAN IN RCRD: CPT | Mod: CPTII,,, | Performed by: NURSE PRACTITIONER

## 2023-07-11 PROCEDURE — 3078F DIAST BP <80 MM HG: CPT | Mod: CPTII,,, | Performed by: NURSE PRACTITIONER

## 2023-07-11 PROCEDURE — 99215 PR OFFICE/OUTPT VISIT, EST, LEVL V, 40-54 MIN: ICD-10-PCS | Mod: ,,, | Performed by: NURSE PRACTITIONER

## 2023-07-11 RX ORDER — ESCITALOPRAM OXALATE 5 MG/1
5 TABLET ORAL
COMMUNITY
Start: 2023-06-26 | End: 2023-07-24 | Stop reason: SDUPTHER

## 2023-07-11 RX ORDER — PANTOPRAZOLE SODIUM 40 MG/1
40 TABLET, DELAYED RELEASE ORAL 2 TIMES DAILY
COMMUNITY
End: 2023-07-24 | Stop reason: SDUPTHER

## 2023-07-11 RX ORDER — FUROSEMIDE 40 MG/1
40 TABLET ORAL DAILY
COMMUNITY
End: 2023-07-24 | Stop reason: SDUPTHER

## 2023-07-11 RX ORDER — LORAZEPAM 0.5 MG/1
0.5 TABLET ORAL 2 TIMES DAILY PRN
COMMUNITY
Start: 2023-06-26 | End: 2023-07-24 | Stop reason: SDUPTHER

## 2023-07-11 RX ORDER — METOPROLOL SUCCINATE 25 MG/1
25 TABLET, EXTENDED RELEASE ORAL
COMMUNITY
Start: 2023-06-26 | End: 2023-07-24 | Stop reason: SDUPTHER

## 2023-07-11 RX ORDER — DILTIAZEM HYDROCHLORIDE 240 MG/1
240 CAPSULE, COATED, EXTENDED RELEASE ORAL
COMMUNITY
Start: 2023-05-31 | End: 2023-07-11

## 2023-07-11 RX ORDER — FERROUS GLUCONATE 324(38)MG
TABLET ORAL
COMMUNITY
End: 2023-07-11

## 2023-07-11 NOTE — ASSESSMENT & PLAN NOTE
Continue Lexapro 5 mg daily.  Continue lorazepam 0.5 mg twice daily.  Follow-up in October as scheduled.

## 2023-07-11 NOTE — ASSESSMENT & PLAN NOTE
Initial potassium on admission at [facility] was 2.7, 3.3 upon.  Will get a repeat BMP and call with results.

## 2023-07-11 NOTE — ASSESSMENT & PLAN NOTE
Was taken off the Lyrica per [facility] and started on gabapentin.  Gabapentin not helping.  Start gabapentin and restart Lyrica 75 mg twice daily.

## 2023-07-11 NOTE — PROGRESS NOTES
Subjective:       Patient ID: Jaylin Marcum is a 82 y.o. female.    Chief Complaint: Transitional Care      HPI   This is an 82-year-old white female who presents to clinic today for hospital follow-up visit.  Patient was admitted for observation at [facility] from June 23rd through the 25th for difficulty swallowing.  Patient has a longstanding history of mediastinal mass discovered back in 2020.  At this time, it was causing some dysphagia but patient opted not to biopsy or treat.  Patient went to the ED at [facility] in May because she felt like she was choking on a piece of food when eating.  She was extremely anxious and short of breath.  She was again noted to have mediastinal mass but was only ingesting liquid diet and crushed meds by this time due to anxiety from the dysphagia.  She was seen by Dr. Sadler who remembered her case well.  Patient still declined biopsy of mass or any further intervention.  Declined PEG tube for nutrition.  She went home under the impression that she was still not allowed to swallow any solid food and continued to be extremely anxious.  She presented to [facility] at the end of last month with the same symptoms.  They did a scan and told her she did not have any mass and now she is swallowing soft foods just fine.  She is no longer anxious about choking.  She is now living with her other son and daughter-in-law and they will be managing her care from now on.  She has home health [facility].  She is requesting physical therapy to help regain some of her strength because she has difficulty walking due to her CHF and COPD and overall weakness.  She also is requesting assistance with bathing.  She does use home oxygen for her COPD which is managed by Dr. Esquivel.    On review of hospital records, patient was noted to be hypokalemic with MARQUITA.  She was given potassium replacement and her Lasix was decreased to 40 mg daily.  Review of Systems    Constitutional:  Positive for activity change and appetite change.   HENT: Negative.     Eyes: Negative.    Respiratory:  Positive for shortness of breath.    Cardiovascular: Negative.    Gastrointestinal: Negative.    Endocrine: Negative.    Genitourinary: Negative.    Musculoskeletal:  Positive for back pain.   Integumentary:  Negative.   Allergic/Immunologic: Negative.    Neurological: Negative.    Hematological: Negative.    Psychiatric/Behavioral: Negative.     All other systems reviewed and are negative.  Comprehensive review of systems negative except as stated in HPI    The patient's Health Maintenance was reviewed and the following appears to be due:   Health Maintenance Due   Topic Date Due    DEXA Scan  Never done       Past Medical History:  Past Medical History:   Diagnosis Date    Anxiety disorder, unspecified     CHF (congestive heart failure)     Chronic obstructive lung disease     CKD (chronic kidney disease)     DVT (deep venous thrombosis)     Gout, unspecified     Hyperlipidemia     Hypertensive disorder     Lumbar radiculopathy     Mediastinal mass     On home oxygen therapy     Paroxysmal atrial fibrillation     Peripheral artery disease     Postmenopausal state      Past Surgical History:   Procedure Laterality Date    APPENDECTOMY      DILATION AND CURETTAGE OF UTERUS       Review of patient's allergies indicates:   Allergen Reactions    Penicillin Rash    Codeine Itching     Current Outpatient Medications on File Prior to Visit   Medication Sig Dispense Refill    EScitalopram oxalate (LEXAPRO) 5 MG Tab Take 5 mg by mouth.      furosemide (LASIX) 40 MG tablet Take 40 mg by mouth once daily.      LORazepam (ATIVAN) 0.5 MG tablet Take 0.5 mg by mouth 2 (two) times daily as needed.      pantoprazole (PROTONIX) 40 MG tablet Take 40 mg by mouth 2 (two) times a day.      [DISCONTINUED] ferrous gluconate (FERGON) 324 MG tablet 1 tablet with water or juice between meals Orally Once a day for 30  day(s)      albuterol (PROVENTIL) 2.5 mg /3 mL (0.083 %) nebulizer solution       albuterol (PROVENTIL/VENTOLIN HFA) 90 mcg/actuation inhaler INHALE 2 PUFFS EVERY 4 HOURS 18 g 5    atorvastatin (LIPITOR) 20 MG tablet Take 1 tablet (20 mg total) by mouth once daily. 30 tablet 11    BREZTRI AEROSPHERE 160-9-4.8 mcg/actuation HFAA Inhale 2 puffs into the lungs 2 (two) times a day. 10.7 g 11    cetirizine (ZYRTEC) 10 MG tablet TAKE ONE TABLET BY MOUTH IN THE EVENING 30 tablet 5    doxepin (SINEQUAN) 25 MG capsule TAKE ONE CAPSULE BY MOUTH IN THE EVENING 30 capsule 5    ELIQUIS 5 mg Tab Take 5 mg by mouth 2 (two) times daily.      fenofibrate (TRICOR) 54 MG tablet TAKE ONE TABLET BY MOUTH DAILY 30 tablet 5    metoprolol succinate (TOPROL-XL) 25 MG 24 hr tablet Take 25 mg by mouth.      montelukast (SINGULAIR) 10 mg tablet TAKE ONE TABLET BY MOUTH DAILY 30 tablet 5    pramipexole (MIRAPEX) 0.25 MG tablet TAKE ONE TABLET BY MOUTH IN THE EVENING 30 tablet 5    pregabalin (LYRICA) 75 MG capsule TAKE ONE CAPSULE BY MOUTH TWICE DAILY 60 capsule 2    theophylline 400 mg Tb24 TAKE ONE-HALF TABLET BY MOUTH TWICE DAILY 15 tablet 5    [DISCONTINUED] acetaminophen (TYLENOL) 650 MG TbSR Take 650 mg by mouth every 8 (eight) hours.      [DISCONTINUED] allopurinoL (ZYLOPRIM) 100 MG tablet TAKE ONE TABLET BY MOUTH DAILY for gout 30 tablet 5    [DISCONTINUED] amLODIPine (NORVASC) 10 MG tablet TAKE ONE TABLET BY MOUTH DAILY 90 tablet 3    [DISCONTINUED] diltiaZEM (CARDIZEM CD) 240 MG 24 hr capsule Take 240 mg by mouth.      [DISCONTINUED] ferrous sulfate (FEOSOL) 325 mg (65 mg iron) Tab tablet Take 325 mg by mouth.      [DISCONTINUED] fluticasone propionate (FLONASE) 50 mcg/actuation nasal spray use 2 sprays in each nostril daily 16 g 5    [DISCONTINUED] furosemide (LASIX) 40 MG tablet TAKE ONE TABLET BY MOUTH IN THE MORNING and at 2 pm 60 tablet 5    [DISCONTINUED] ketoconazole (NIZORAL) 2 % cream apply to affected area once a day 60 g 2  "   [DISCONTINUED] LORazepam (ATIVAN) 2 mg/mL Conc Take 0.5 mLs (1 mg total) by mouth every 8 (eight) hours as needed (anxiety). 45 mL 0    [DISCONTINUED] methocarbamoL (ROBAXIN) 750 MG Tab TAKE ONE TABLET BY MOUTH THREE TIMES DAILY AS NEEDED for back pain and spasm      [DISCONTINUED] metoprolol succinate (TOPROL-XL) 100 MG 24 hr tablet Take 100 mg by mouth once daily. Take 1 tab in AM and take 1/2 tab in evening      [DISCONTINUED] nystatin (MYCOSTATIN) powder Apply topically 4 (four) times daily. 60 g 5    [DISCONTINUED] pantoprazole (PROTONIX) 40 MG tablet TAKE ONE TABLET BY MOUTH DAILY 30 tablet 5     No current facility-administered medications on file prior to visit.     Social History     Socioeconomic History    Marital status:    Tobacco Use    Smoking status: Former     Types: Cigarettes    Smokeless tobacco: Never   Substance and Sexual Activity    Alcohol use: Not Currently    Drug use: Never    Sexual activity: Not Currently     Family History   Problem Relation Age of Onset    Lung cancer Mother     Brain cancer Mother     Heart disease Father     Heart disease Brother        Objective:       /74 (BP Location: Left arm)   Pulse 71   Temp 99.1 °F (37.3 °C) (Oral)   Resp 16   Ht 5' 1" (1.549 m)   Wt 79.3 kg (174 lb 12.8 oz)   SpO2 97%   BMI 33.03 kg/m²      Physical Exam  Vitals and nursing note reviewed.   Constitutional:       Appearance: Normal appearance. She is obese.   HENT:      Head: Normocephalic and atraumatic.      Right Ear: Tympanic membrane, ear canal and external ear normal.      Left Ear: Tympanic membrane, ear canal and external ear normal.      Nose: Nose normal.      Mouth/Throat:      Mouth: Mucous membranes are moist.      Pharynx: Oropharynx is clear.   Eyes:      Extraocular Movements: Extraocular movements intact.      Conjunctiva/sclera: Conjunctivae normal.      Pupils: Pupils are equal, round, and reactive to light.   Cardiovascular:      Rate and Rhythm: " Normal rate and regular rhythm.   Pulmonary:      Effort: Pulmonary effort is normal.      Breath sounds: Normal breath sounds.   Musculoskeletal:         General: Normal range of motion.      Cervical back: Normal range of motion and neck supple.   Skin:     General: Skin is warm and dry.   Neurological:      General: No focal deficit present.      Mental Status: She is alert and oriented to person, place, and time.   Psychiatric:         Mood and Affect: Mood normal.         Behavior: Behavior normal.         Thought Content: Thought content normal.         Judgment: Judgment normal.           Assessment and Plan       ICD-10-CM ICD-9-CM   1. Mediastinal mass  J98.59 786.6   2. Other dysphagia  R13.19 787.29   3. Lumbar radiculopathy  M54.16 724.4   4. Generalized anxiety disorder  F41.1 300.02   5. Atrial fibrillation, unspecified type  I48.91 427.31   6. Congestive heart failure, unspecified HF chronicity, unspecified heart failure type  I50.9 428.0   7. Primary hypertension  I10 401.9   8. Chronic kidney disease, stage 3a  N18.31 585.3   9. Hypokalemia  E87.6 276.8   10. Chronic obstructive pulmonary disease, unspecified COPD type  J44.9 496        1. Mediastinal mass  Overview:  Incidentally noted on CT chest rule out PE during hospital admission on 12/28/2020 at [facility] - soft tissue density in right periesophageal region at the level of the jay measuring 2.5 x 3.3 cm compressing and perhaps slightly displacing the esophagus posteriorly into the left to the midline.    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  Is swallowing soft diet and pureed foods well without difficulty.  She is crushing her medications.  I do recommend continued soft/pureed diet and crushed medications.      2. Other dysphagia  Assessment & Plan:  Improved with soft pureed diet.  Follow-up in October as scheduled.      3. Lumbar radiculopathy  Overview:  L spine Xray 07/15/2021 - no compression deformities, moderate degree of spondylosis  Lyrica 75 mg BID  01/06/2023 - [facility]     Assessment & Plan:  Was taken off the Lyrica per [facility] and started on gabapentin.  Gabapentin not helping.  Start gabapentin and restart Lyrica 75 mg twice daily.      4. Generalized anxiety disorder  Overview:  Lorazepam 0.5 mg b.i.d.    June 2023 - started Lexapro 5 mg daily on discharge from hospital admission at [facility]    Assessment & Plan:  Continue Lexapro 5 mg daily.  Continue lorazepam 0.5 mg twice daily.  Follow-up in October as scheduled.      5. Atrial fibrillation, unspecified type  Overview:  Dr Brittney Wright 5 mg BID    June 2023 - metoprolol decreased to 25 mg daily on discharge from [facility]    Assessment & Plan:  Heart rate and blood pressure stable on current dose of metoprolol XL 25 mg daily.  Encouraged patient's family members to bring her in for follow-up with Dr. Lugo.  Will send note to Dr. Lugo as well.      6. Congestive heart failure, unspecified HF chronicity, unspecified heart failure type  Overview:  Dr Lugo   Last Echo 12/29/2020 - LVEF 35-40%  Off Entresto due to MARQUITA  Nephrology manages diuretics    Assessment & Plan:  Lasix decreased to 40 mg daily instead of twice daily during hospital admission at [facility].  Will get a repeat BMP per home health and also forward results to Dr. Culp.      7. Primary hypertension  Overview:  Previously on amlodipine 10 mg daily and metoprolol  mg tablet 1 tablet in the morning and  half a tablet in the evening    June 2023 - discharge from hospital admission at [facility] on only metoprolol XL 25 mg daily    Assessment & Plan:  Blood pressure stable on metoprolol XL 25 mg daily, home health to continue to monitor blood pressure at home.  Encouraged cardiology follow-up, follow-up here in October as scheduled.      8. Chronic kidney disease, stage 3a  Overview:  Dr Culp    Assessment & Plan:  MARQUITA noted during hospital admission at [facility] at the end of June.  Creatinine trended back down to 1.33 on discharge.  Will have home health get a BMP, will forward results to Dr. Culp.      9. Hypokalemia  Assessment & Plan:  Initial potassium on admission at [facility] was 2.7, 3.3 upon.  Will get a repeat BMP and call with results.      10. Chronic obstructive pulmonary disease, unspecified COPD type  Overview:  Dr Esquivel  Home O2 2L/min NC  [facility]    Assessment & Plan:  Continue follow-up with pulmonology, continue home oxygen use.  Will get order for CNA at home to assist with bathing through home health.         Extended medication reconciliation completed over the phone with patient's daughter-in-law.    Follow up in about 4 months (around 10/28/2023) for Annual.

## 2023-07-11 NOTE — ASSESSMENT & PLAN NOTE
Heart rate and blood pressure stable on current dose of metoprolol XL 25 mg daily.  Encouraged patient's family members to bring her in for follow-up with Dr. Lugo.  Will send note to Dr. Luog as well.

## 2023-07-11 NOTE — ASSESSMENT & PLAN NOTE
Blood pressure stable on metoprolol XL 25 mg daily, home health to continue to monitor blood pressure at home.  Encouraged cardiology follow-up, follow-up here in October as scheduled.

## 2023-07-11 NOTE — ASSESSMENT & PLAN NOTE
Continue follow-up with pulmonology, continue home oxygen use.  Will get order for CNA at home to assist with bathing through home health.

## 2023-07-11 NOTE — ASSESSMENT & PLAN NOTE
Lasix decreased to 40 mg daily instead of twice daily during hospital admission at [facility].  Will get a repeat BMP per home health and also forward results to Dr. Culp.

## 2023-07-11 NOTE — ASSESSMENT & PLAN NOTE
MARQUITA noted during hospital admission at [facility] at the end of June.  Creatinine trended back down to 1.33 on discharge.  Will have [place] [facility] get a BMP, will forward results to Dr. Culp.

## 2023-07-11 NOTE — ASSESSMENT & PLAN NOTE
Patient still refusing biopsy of mass, refusing PEG tube.  Is swallowing soft diet and pureed foods well without difficulty.  She is crushing her medications.  I do recommend continued soft/pureed diet and crushed medications.

## 2023-07-11 NOTE — LETTER
AUTHORIZATION FOR RELEASE OF   CONFIDENTIAL INFORMATION    Dear [facility] Medical Records,    We are seeing Jaylin Marcum, date of birth 1940, in the clinic at [facility]. REESE Black is the patient's PCP. Jaylin Marcum has an outstanding lab/procedure at the time we reviewed her chart. In order to help keep her health information updated, she has authorized us to request the following medical record(s):        (  )  MAMMOGRAM                                      (  )  COLONOSCOPY      (  )  PAP SMEAR                                          (  )  OUTSIDE LAB RESULTS     (  )  DEXA SCAN                                          (  )  EYE EXAM           (X)  ENTIRE RECORD (Recent Hospitalization)     (  )  OUTSIDE IMMUNIZATIONS                 (  )  _______________         Please fax records to [facility], Kathryn F Duplantis, FNP, [phone]     If you have any questions, please contact us at [phone].      Patient Name: Jaylin Marcum  : 1940  Patient Phone #: [phone]

## 2023-07-24 RX ORDER — LORAZEPAM 0.5 MG/1
0.5 TABLET ORAL 2 TIMES DAILY PRN
Qty: 60 TABLET | Refills: 3 | Status: SHIPPED | OUTPATIENT
Start: 2023-07-24 | End: 2023-11-20

## 2023-07-24 RX ORDER — ESCITALOPRAM OXALATE 5 MG/1
5 TABLET ORAL DAILY
Qty: 30 TABLET | Refills: 3 | Status: SHIPPED | OUTPATIENT
Start: 2023-07-24 | End: 2023-11-20

## 2023-07-24 RX ORDER — METOPROLOL SUCCINATE 25 MG/1
25 TABLET, EXTENDED RELEASE ORAL DAILY
Qty: 30 TABLET | Refills: 3 | Status: SHIPPED | OUTPATIENT
Start: 2023-07-24 | End: 2023-11-20

## 2023-07-24 RX ORDER — PANTOPRAZOLE SODIUM 40 MG/1
40 TABLET, DELAYED RELEASE ORAL 2 TIMES DAILY
Qty: 60 TABLET | Refills: 3 | Status: SHIPPED | OUTPATIENT
Start: 2023-07-24 | End: 2023-11-08

## 2023-07-24 RX ORDER — FUROSEMIDE 40 MG/1
40 TABLET ORAL DAILY
Qty: 30 TABLET | Refills: 3 | Status: SHIPPED | OUTPATIENT
Start: 2023-07-24

## 2023-07-24 NOTE — TELEPHONE ENCOUNTER
----- Message from Kennedy Ponce sent at 7/24/2023  9:32 AM CDT -----  .Type:  Needs Medical Advice    Who Called: Randall bills [facility]  Symptoms (please be specific):    How long has patient had these symptoms:    Pharmacy name and phone #:    Would the patient rather a call back or a response via [facility]?   Best Call Back Number: [phone]  Additional Information: he would like a call back from office re: some medication scripts he is checking on for this patient.

## 2023-07-25 RX ORDER — ALBUTEROL SULFATE 0.83 MG/ML
SOLUTION RESPIRATORY (INHALATION)
Qty: 90 ML | Refills: 2 | Status: SHIPPED | OUTPATIENT
Start: 2023-07-25

## 2023-07-31 LAB
AGE: 82
ANION GAP SERPL CALC-SCNC: 8 MMOL/L (ref 5–16)
CALCIUM SERPL-MCNC: 9.5 MG/DL (ref 8.4–10.2)
CHLORIDE: 106 MMOL/L (ref 98–107)
CO2 SERPL-SCNC: 29 MMOL/L (ref 23–31)
EGFR: 54
GLUCOSE: 62 (ref 82–115)
POTASSIUM: 4.1 MMOL/L (ref 3.5–5.1)
SODIUM BLD-SCNC: 143 MMOL/L (ref 136–145)
UREA NITROGEN (BUN): 23.76 MG/DL (ref 9.8–20.1)

## 2023-08-01 ENCOUNTER — DOCUMENTATION ONLY (OUTPATIENT)
Dept: FAMILY MEDICINE | Facility: CLINIC | Age: 83
End: 2023-08-01
Payer: MEDICARE

## 2023-08-09 ENCOUNTER — TELEPHONE (OUTPATIENT)
Dept: FAMILY MEDICINE | Facility: CLINIC | Age: 83
End: 2023-08-09
Payer: MEDICARE

## 2023-08-09 DIAGNOSIS — H60.501 ACUTE OTITIS EXTERNA OF RIGHT EAR, UNSPECIFIED TYPE: Primary | ICD-10-CM

## 2023-08-09 RX ORDER — NEOMYCIN SULFATE, POLYMYXIN B SULFATE AND HYDROCORTISONE 10; 3.5; 1 MG/ML; MG/ML; [USP'U]/ML
3 SUSPENSION/ DROPS AURICULAR (OTIC) 3 TIMES DAILY
Qty: 10 ML | Refills: 0 | Status: SHIPPED | OUTPATIENT
Start: 2023-08-09

## 2023-08-09 NOTE — TELEPHONE ENCOUNTER
Home health communication received stating patient complaining of discomfort to her right ear with pain, tingling, and itching.  Spoke to patient, states she got water in her ear and since then she has been having pain in her ear.  She put some peroxide in it and that is why it was burning.  Denies any rash or burning pain around the year.  Will send Cortisporin otic and patient will follow-up for any worsening.

## 2023-08-10 ENCOUNTER — TELEPHONE (OUTPATIENT)
Dept: FAMILY MEDICINE | Facility: CLINIC | Age: 83
End: 2023-08-10
Payer: MEDICARE

## 2023-08-10 DIAGNOSIS — F41.9 ANXIETY: Primary | ICD-10-CM

## 2023-08-10 DIAGNOSIS — J44.9 CHRONIC OBSTRUCTIVE PULMONARY DISEASE, UNSPECIFIED COPD TYPE: Primary | ICD-10-CM

## 2023-08-10 DIAGNOSIS — J44.9 CHRONIC OBSTRUCTIVE PULMONARY DISEASE, UNSPECIFIED COPD TYPE: ICD-10-CM

## 2023-08-10 RX ORDER — THEOPHYLLINE 400 MG/1
TABLET, EXTENDED RELEASE ORAL
Qty: 30 TABLET | Refills: 11 | Status: SHIPPED | OUTPATIENT
Start: 2023-08-10

## 2023-08-10 RX ORDER — LORAZEPAM 0.5 MG/1
0.5 TABLET ORAL 2 TIMES DAILY PRN
Qty: 60 TABLET | Refills: 3 | OUTPATIENT
Start: 2023-08-10

## 2023-08-10 NOTE — TELEPHONE ENCOUNTER
Patients family called concerned about medication  increase to 3 times a day and up dosage. Family was notified that we at this time are not increasing medication nor how she is to take it has changed (2 times a day)

## 2023-08-11 RX ORDER — PREDNISONE 20 MG/1
20 TABLET ORAL DAILY
Qty: 5 TABLET | Refills: 0 | Status: SHIPPED | OUTPATIENT
Start: 2023-08-11

## 2023-08-11 NOTE — TELEPHONE ENCOUNTER
Spoke with the pt's daughter in-law and informed her that medication was sent in. She verbalized understanding.

## 2023-08-11 NOTE — TELEPHONE ENCOUNTER
Call from Arline with [employer] stating that she was with Jaylin who is complaining of a productive cough.  She does not have any fever and all her vitals are good.  She does get short winded but she is using O2 as needed.  Patient is requesting a script of Prednisone to be called in at [facility] in [place].

## 2023-09-07 DIAGNOSIS — G62.9 PERIPHERAL POLYNEUROPATHY: ICD-10-CM

## 2023-09-07 RX ORDER — PREGABALIN 75 MG/1
75 CAPSULE ORAL 2 TIMES DAILY
Qty: 60 CAPSULE | Refills: 2 | Status: SHIPPED | OUTPATIENT
Start: 2023-09-07 | End: 2023-09-27

## 2023-09-07 NOTE — TELEPHONE ENCOUNTER
----- Message from Bharti Jenkins sent at 9/7/2023  3:51 PM CDT -----  Regarding: Refill  .Type:  RX Refill Request    Who Called: Angela mother in law  Refill or New Rx:Refill  RX Name and Strength:pregabalin (LYRICA) 75 MG capsule  How is the patient currently taking it? (ex. 1XDay):2/ day  Is this a 30 day or 90 day RX:30  Preferred Pharmacy with phone number:[facility]  Local or Mail Order:Local  Ordering Provider:Lisa  Would the patient rather a call back or a response via [facility]?   Best Call Back Number:[phone]  Additional Information: pregabalin (LYRICA) 75 MG capsule

## 2023-09-19 ENCOUNTER — DOCUMENT SCAN (OUTPATIENT)
Dept: HOME HEALTH SERVICES | Facility: HOSPITAL | Age: 83
End: 2023-09-19
Payer: MEDICARE

## 2023-09-27 ENCOUNTER — TELEPHONE (OUTPATIENT)
Dept: FAMILY MEDICINE | Facility: CLINIC | Age: 83
End: 2023-09-27
Payer: MEDICARE

## 2023-09-27 DIAGNOSIS — G25.81 RLS (RESTLESS LEGS SYNDROME): ICD-10-CM

## 2023-09-27 DIAGNOSIS — M54.16 LUMBAR RADICULOPATHY: Primary | ICD-10-CM

## 2023-09-27 RX ORDER — PREGABALIN 100 MG/1
100 CAPSULE ORAL 2 TIMES DAILY
Qty: 60 CAPSULE | Refills: 2 | Status: SHIPPED | OUTPATIENT
Start: 2023-09-27 | End: 2024-03-27

## 2023-09-27 NOTE — TELEPHONE ENCOUNTER
----- Message from Yvonnealeksandar Briscoe sent at 9/27/2023  3:49 PM CDT -----  Regarding: advice  Type:  Needs Medical Advice    Who Called: pt  Symptoms (please be specific):    How long has patient had these symptoms:    Pharmacy name and phone #:    Would the patient rather a call back or a response via [facility]?   Best Call Back Number: [phone]  Additional Information: pt called about not receiving a call from the nurse about her leg pain. Was told information was given to daughter in law but is wanting to speak to the nurse about it. Please advise

## 2023-09-27 NOTE — TELEPHONE ENCOUNTER
Spoke with the pt's daughter in law, Angela and she states that the medication that was give to the pt for restless legs syndrome does not seem to be working. She states she also tried creams for pain with no relief. They are requesting something else to help with the leg pain.

## 2023-09-27 NOTE — TELEPHONE ENCOUNTER
The pt called stating that she did not know why we spoke with her daughter-in-law instead of her. I informed her that the call that we received earlier had Angela's phone number as the call back number which is why I called that number this morning.   Discussed with the pt that [facility] had [facility] her [facility] pain and explained that [facility] is not able to treat that pain with any type of pain management due to it being chronic pain. Pt states she saw them and the prescribed her something for 1-2 weeks and then told her to follow up with Dr. Hayes. Pt states she has an appointment with Dr. Hayes next week, but she is having to walk around all night due to the severity of her pain.   After speaking with Liz, the pt was informed that we would send in an increase dosage of the Lyrica to see if this will give her any relief. Also explained to the pt that Dr. Hayes is a PCP and not pain management. She states she saw Dr. Hayes in the hospital and was scheduled to see him and that he gave her shots in her hips that helped the pain for a couple of days. When asking the pt if she will be switching providers to Dr. Hayes, the pt states she is not switching to him as her PCP, but has to follow-up with him for the leg pain. I again explained to her that he is a PCP just like Liz. She said she is going to see him because he was able to give her the shot for her legs.

## 2023-09-27 NOTE — TELEPHONE ENCOUNTER
----- Message from Padmini Plummer sent at 9/27/2023 11:20 AM CDT -----  Regarding: pain - med  .Type:  Needs Medical Advice    Who Called: pt  Symptoms (please be specific): leg pain   How long has patient had these symptoms:  days  Pharmacy name and phone #:    Would the patient rather a call back or a response via [facility]?   Best Call Back Number: [phone]  Additional Information: Pt is requesting something for pain

## 2023-09-27 NOTE — TELEPHONE ENCOUNTER
Spoke with pt's daughter-Angela cifuentes and explained that there is nothing stronger that she can prescribe the pt for the leg pain and that she was referred to [facility] for this in January. She asked if she can be prescribed a pain pill. I informed her that [facility] is not able to prescribe pain medicines for chronic conditions which is why the pt was referred out. She verbalized understanding.

## 2023-09-27 NOTE — TELEPHONE ENCOUNTER
I referred [facility] in [place] in January due to continued leg pain. She is on Lyrica 75 mg twice daily for that. I cannot give anything stronger.  She is on Mirapex for her restless legs.  I did not change either of these medications recently.  I still suggest that she follow-up with [facility] center for her back and leg pain.

## 2023-10-23 ENCOUNTER — TELEPHONE (OUTPATIENT)
Dept: FAMILY MEDICINE | Facility: CLINIC | Age: 83
End: 2023-10-23

## 2023-10-23 DIAGNOSIS — N18.31 CHRONIC KIDNEY DISEASE, STAGE 3A: ICD-10-CM

## 2023-10-23 DIAGNOSIS — E78.5 HYPERLIPIDEMIA, UNSPECIFIED HYPERLIPIDEMIA TYPE: Primary | ICD-10-CM

## 2023-10-23 DIAGNOSIS — G25.81 RLS (RESTLESS LEGS SYNDROME): Primary | ICD-10-CM

## 2023-10-23 RX ORDER — PRAMIPEXOLE DIHYDROCHLORIDE 0.25 MG/1
0.25 TABLET ORAL NIGHTLY
Qty: 30 TABLET | Refills: 5 | Status: SHIPPED | OUTPATIENT
Start: 2023-10-23

## 2023-10-23 NOTE — TELEPHONE ENCOUNTER
----- Message from Kennedy Ponce sent at 10/23/2023  1:03 PM CDT -----  .Type:  Needs Medical Advice    Who Called: Angela  Symptoms (please be specific):    How long has patient had these symptoms:    Pharmacy name and phone #:  [facility] in [place]  Would the patient rather a call back or a response via [facility]?   Best Call Back Number: [phone]  Additional Information: The patient's daughter requested a refill of her mother's gout medication, she did not know what the name of it was.      Please give her a call back when refill is sent to the pharmacy.

## 2023-10-23 NOTE — TELEPHONE ENCOUNTER
Labs ordered.  Can you please see if she followed up with Dr. Trejo since the last time she was here.  I encouraged them to get her back in with Cardiology at her hospital follow-up here.  Looks like she has not filled her Eliquis which is her blood thinner for her AFib, since May.  She normally gets that from Cardiology, they may have stopped refilling it if she is not going to follow-ups.

## 2023-10-23 NOTE — TELEPHONE ENCOUNTER
Patient will complete labs at [facility].  She has not followed up with Dr. Lugo but will call and schedule. Patient has not filled her Eliquis because she has a bunch at home but she has been taking them as prescribed.

## 2023-11-08 DIAGNOSIS — R13.19 OTHER DYSPHAGIA: Primary | ICD-10-CM

## 2023-11-08 RX ORDER — PANTOPRAZOLE SODIUM 40 MG/1
40 TABLET, DELAYED RELEASE ORAL 2 TIMES DAILY
Qty: 60 TABLET | Refills: 3 | Status: SHIPPED | OUTPATIENT
Start: 2023-11-08

## 2024-05-14 RX ORDER — ESCITALOPRAM OXALATE 5 MG/1
5 TABLET ORAL
Qty: 30 TABLET | Refills: 1 | OUTPATIENT
Start: 2024-05-14

## 2024-05-14 RX ORDER — METOPROLOL SUCCINATE 25 MG/1
25 TABLET, EXTENDED RELEASE ORAL
Qty: 30 TABLET | Refills: 1 | OUTPATIENT
Start: 2024-05-14

## 2024-06-06 NOTE — ASSESSMENT & PLAN NOTE
Patient admits that she has not seen Dr. Roy in a couple of years, patient given phone number for Dr. Roy and will be calling to schedule an appointment.   Statement Selected

## 2024-06-17 DIAGNOSIS — R13.19 OTHER DYSPHAGIA: ICD-10-CM

## 2024-06-17 RX ORDER — PANTOPRAZOLE SODIUM 40 MG/1
40 TABLET, DELAYED RELEASE ORAL 2 TIMES DAILY
Qty: 60 TABLET | Refills: 3 | Status: SHIPPED | OUTPATIENT
Start: 2024-06-17

## 2024-08-12 DIAGNOSIS — J44.9 CHRONIC OBSTRUCTIVE PULMONARY DISEASE, UNSPECIFIED COPD TYPE: ICD-10-CM

## 2024-08-12 RX ORDER — THEOPHYLLINE 400 MG/1
TABLET, EXTENDED RELEASE ORAL
Qty: 30 TABLET | Refills: 11 | OUTPATIENT
Start: 2024-08-12

## 2025-03-04 ENCOUNTER — HOSPITAL ENCOUNTER (EMERGENCY)
Facility: HOSPITAL | Age: 85
Discharge: HOME | End: 2025-03-04
Attending: SPECIALIST
Payer: MEDICARE

## 2025-03-04 VITALS
DIASTOLIC BLOOD PRESSURE: 89 MMHG | TEMPERATURE: 98 F | HEIGHT: 62 IN | OXYGEN SATURATION: 100 % | BODY MASS INDEX: 23.92 KG/M2 | WEIGHT: 130 LBS | SYSTOLIC BLOOD PRESSURE: 160 MMHG | HEART RATE: 101 BPM | RESPIRATION RATE: 18 BRPM

## 2025-03-04 DIAGNOSIS — R06.02 SHORTNESS OF BREATH: ICD-10-CM

## 2025-03-04 DIAGNOSIS — F41.0 ANXIETY ATTACK: Primary | ICD-10-CM

## 2025-03-04 PROCEDURE — 93010 ELECTROCARDIOGRAM REPORT: CPT | Mod: ,,, | Performed by: STUDENT IN AN ORGANIZED HEALTH CARE EDUCATION/TRAINING PROGRAM

## 2025-03-04 PROCEDURE — 99283 EMERGENCY DEPT VISIT LOW MDM: CPT | Mod: 25

## 2025-03-04 PROCEDURE — 93005 ELECTROCARDIOGRAM TRACING: CPT

## 2025-03-05 NOTE — ED PROVIDER NOTES
"Encounter Date: 3/4/2025       History     Chief Complaint   Patient presents with    Anxiety     Pt brought in by EMS c/o SOB & anxiety after argument with son; per pt she is "always short of breath" & on 3L NC at home for COPD; O2 sat 98% RA. Hx a-fib. VSS. Per EMS pt was in no acute distress upon arrival.      84 year old female was brought in by EMS after she and her son got into a verbal altercation. She states "I had a panic attack and got short of breath". She is on oxygen at home and thinks her oxygen may have ran out. No chest pain or vomiting. She reports this is typical of her panic attacks.     The history is provided by the patient and the EMS personnel. No  was used.     Review of patient's allergies indicates:   Allergen Reactions    Penicillin Rash    Codeine Itching     Past Medical History:   Diagnosis Date    Anxiety disorder, unspecified     CHF (congestive heart failure)     Chronic obstructive lung disease     CKD (chronic kidney disease)     DVT (deep venous thrombosis)     Gout, unspecified     Hyperlipidemia     Hypertensive disorder     Lumbar radiculopathy     Mediastinal mass     On home oxygen therapy     Paroxysmal atrial fibrillation     Peripheral artery disease     Postmenopausal state      Past Surgical History:   Procedure Laterality Date    APPENDECTOMY      DILATION AND CURETTAGE OF UTERUS       Family History   Problem Relation Name Age of Onset    Lung cancer Mother      Brain cancer Mother      Heart disease Father      Heart disease Brother       Social History[1]  Review of Systems   Respiratory:  Positive for shortness of breath.    Cardiovascular:  Negative for chest pain.   Gastrointestinal:  Negative for vomiting.   Neurological:  Negative for syncope.   Psychiatric/Behavioral:  The patient is nervous/anxious.    All other systems reviewed and are negative.      Physical Exam     Initial Vitals [03/04/25 1821]   BP Pulse Resp Temp SpO2   (!) 160/89 80 " "18 98.1 °F (36.7 °C) 98 %      MAP       --         Physical Exam    Nursing note and vitals reviewed.  Constitutional: She appears well-developed and well-nourished.   HENT:   Head: Normocephalic.   Eyes: EOM are normal.   Neck: Neck supple.   Cardiovascular:  Intact distal pulses. An irregularly irregular rhythm present.           Pulmonary/Chest: Breath sounds normal. No respiratory distress.   Abdominal: She exhibits no distension. There is no abdominal tenderness.   Musculoskeletal:         General: Normal range of motion.      Cervical back: Neck supple.     Neurological: She is alert and oriented to person, place, and time. GCS score is 15. GCS eye subscore is 4. GCS verbal subscore is 5. GCS motor subscore is 6.   Skin: Skin is warm and dry.   Psychiatric: She has a normal mood and affect.         ED Course   Procedures  Labs Reviewed - No data to display  EKG Readings: (Independently Interpreted)   Initial Reading: No STEMI. Rhythm: Atrial Fibrillation. Heart Rate: 91. ST Segments: Normal ST Segments. T Waves: Normal. Clinical Impression: Atrial Fibrillation       Imaging Results    None          Medications - No data to display  Medical Decision Making  DDX: anxiety, Afib with RVR    84 year old female was brought in by EMS after she and her son got into a verbal altercation. She states "I had a panic attack and got short of breath". She is on oxygen at home and thinks her oxygen may have ran out. No chest pain or vomiting. She reports this is typical of her panic attacks. EKG show Atrial fibrillation without RVR. She has history of A-fib. She in in no respiratory distress. Alert and oriented. Drinking fluids without difficulty.     Amount and/or Complexity of Data Reviewed  ECG/medicine tests: ordered and independent interpretation performed.                                      Clinical Impression:  Final diagnoses:  [R06.02] Shortness of breath  [F41.0] Anxiety attack (Primary)          ED Disposition " Condition    Discharge Stable          ED Prescriptions    None       Follow-up Information    None            [1]   Social History  Tobacco Use    Smoking status: Former     Types: Cigarettes    Smokeless tobacco: Never   Substance Use Topics    Alcohol use: Not Currently    Drug use: Never        Rosana Rogers, REESE  03/04/25 [id number]

## 2025-03-07 LAB
OHS QRS DURATION: 98 MS
OHS QTC CALCULATION: 484 MS